# Patient Record
Sex: FEMALE | Race: WHITE | NOT HISPANIC OR LATINO | Employment: FULL TIME | URBAN - METROPOLITAN AREA
[De-identification: names, ages, dates, MRNs, and addresses within clinical notes are randomized per-mention and may not be internally consistent; named-entity substitution may affect disease eponyms.]

---

## 2017-05-02 ENCOUNTER — TRANSCRIBE ORDERS (OUTPATIENT)
Dept: ADMINISTRATIVE | Facility: HOSPITAL | Age: 57
End: 2017-05-02

## 2017-05-02 DIAGNOSIS — M25.531 RIGHT WRIST PAIN: Primary | ICD-10-CM

## 2017-05-08 ENCOUNTER — HOSPITAL ENCOUNTER (OUTPATIENT)
Dept: RADIOLOGY | Age: 57
Discharge: HOME | End: 2017-05-08
Payer: COMMERCIAL

## 2017-05-08 DIAGNOSIS — M25.531 RIGHT WRIST PAIN: ICD-10-CM

## 2017-05-08 PROCEDURE — 73221 MRI JOINT UPR EXTREM W/O DYE: CPT

## 2017-06-09 ENCOUNTER — GENERIC CONVERSION - ENCOUNTER (OUTPATIENT)
Dept: OBGYN CLINIC | Facility: CLINIC | Age: 57
End: 2017-06-09

## 2018-01-09 NOTE — MISCELLANEOUS
Message   Recorded as Task   Date: 10/20/2016 04:10 PM, Created By: Jp Palomares   Task Name: Follow Up   Assigned To: [facility] [employer]   Regarding Patient: Anshu Up, Status: In Progress   CommentMari Fore - 20 Oct 2016 4:10 PM     TASK CREATED  tell patient and send slip   BekahClaritza - 20 Oct 2016 4:15 PM     TASK IN PROGRESS   BekahClaritza Blackmon - 20 Oct 2016 4:33 PM     TASK EDITED  order placed in allscripts for 6 month f/u rt diag rajeev and u/s    slip faxed to  and sent to pt        Active Problems    1  Abnormal mammogram of right breast (793 80) (R92 8)   2  Dense breasts (793 82) (R92 2)   3  Encounter for gynecological examination without abnormal finding (V72 31) (Z01 419)   4  Encounter for routine gynecological examination with Papanicolaou smear of cervix   (V72 31,V76 2) (Z01 419)   5  Encounter for screening mammogram for malignant neoplasm of breast (V76 12)   (Z12 31)   6  Herpes simplex infection (054 9) (B00 9)   7  Osteopenia (733 90) (M85 80)   8  Screening for human papillomavirus (HPV) (V73 81) (Z11 51)    Current Meds   1  Flaxseed Oil 1000 MG Oral Capsule; Therapy: 49Uyb1570 to Recorded   2  Multi-Vitamin Daily Oral Tablet; Therapy: 00Qcx2089 to Recorded   3  Restasis 0 05 % Ophthalmic Emulsion; Therapy: 65Ijd4065 to Recorded   4  Turmeric Curcumin Oral Capsule Recorded   5  Xalatan 0 005 % Ophthalmic Solution (Latanoprost); Therapy: 44AXM9065 to (Last Rx:04Mar2011)  Requested for: 64GHE0326 Ordered    Allergies    1  No Known Drug Allergies    Plan  Abnormal mammogram of right breast    · *US BREAST RIGHT LIMITED (DIAGNOSTIC); Status:Hold For -  Scheduling,Retrospective By Protocol Authorization; Requested for:[id number];    · MAMMO DIAGNOSTIC RIGHT W CAD; Status:Hold For - Scheduling,Retrospective By  Protocol Authorization;  Requested for:53Uvn3128;     Signatures   Electronically signed by : Adele Baca, ; Oct 20 2016  4:35PM EST (Author)

## 2018-01-12 NOTE — MISCELLANEOUS
Message   Recorded as Task   Date: 06/08/2016 09:14 AM, Created By: Caroll Ormond   Task Name: Call Back   Assigned To: Emma Floyd   Regarding Patient: Vadim De Jesus, Status: In Progress   Comment:    Caroll Ormond - 08 Jun 2016 9:14 AM     TASK CREATED  Caller: Self; General Medical Question; [phone] (Home)  pt called - was expecting a call back from our office as to why she needs a f/u done of her mammo - what was found???  She has not received another Rx in the mail yet - please call pt to discuss  [facility] Jun 2016 9:19 AM     TASK IN PROGRESS   Manuelita Gitelman - 08 Jun 2016 9:24 AM     TASK EDITED  I explained to pt - needs r diag mammo and u/s  Offered to fax slip to [facility] - pt will wait to get it in mail        Active Problems    1  Dense breasts (793 82) (R92 2)   2  Encounter for gynecological examination without abnormal finding (V72 31) (Z01 419)   3  Encounter for routine gynecological examination with Papanicolaou smear of cervix   (V72 31,V76 2) (Z01 419)   4  Encounter for screening mammogram for malignant neoplasm of breast (V76 12)   (Z12 31)   5  Herpes simplex infection (054 9) (B00 9)   6  Osteopenia (733 90) (M85 80)   7  Screening for human papillomavirus (HPV) (V73 81) (Z11 51)    Current Meds   1  Calcium Plus Vitamin D CAPS; Transdermal vitamin d patch Recorded   2  Flaxseed Oil 1000 MG Oral Capsule; Therapy: 01Aug2013 to Recorded   3  Multi-Vitamin Daily Oral Tablet; Therapy: 73Kwz5332 to Recorded   4  Nystatin-Triamcinolone 298187-6 1 UNIT/GM-% External Cream; APPLY SPARINGLY TO   AFFECTED AREA(S) TWICE DAILY; Therapy: 66XDS7954 to (Evaluate:06Jan2014)  Requested for: 70NGP9558; Last   Rx:30Yes5946 Ordered   5  Restasis 0 05 % Ophthalmic Emulsion; Therapy: 31Pun7799 to Recorded   6  ValACYclovir HCl - 1 GM Oral Tablet; TAKE 1 TABLET DAILY AS DIRECTED; Therapy: 39CVJ0120 to (Micahel Galeano)  Requested for: 80GBM7596;  Last Rx: 31LVP2451 Ordered   7  Xalatan 0 005 % Ophthalmic Solution (Latanoprost); Therapy: 53FCM6750 to (Last Rx:04Mar2011)  Requested for: 11FJD2973 Ordered    Allergies    1  No Known Drug Allergies    Signatures   Electronically signed by :  Megan Barillas, ; Jun 8 2016  9:24AM EST                       (Author)

## 2018-01-12 NOTE — MISCELLANEOUS
Message   Recorded as Task   Date: 06/06/2016 08:36 AM, Created By: Bassem Martinez   Task Name: Medical Complaint Callback   Assigned To: Danny Jama   Regarding Patient: Huber Pereira, Status: In Progress   Comment:    Karis Daily - 06 Jun 2016 8:36 AM     TASK CREATED  Caller: Self; [phone] (Home); [phone] (Work)  needs a script for follow up mammo and us of the right breast, goes to [facility], wants a call back to know what to order  [address]  Jun 2016 9:09 AM     TASK IN [address] [place] [address],[address] - 06 Jun 2016 9:27 AM     TASK EDITED  rx sent to EHR and mailed per patient  Active Problems    1  Dense breasts (793 82) (R92 2)   2  Encounter for gynecological examination without abnormal finding (V72 31) (Z01 419)   3  Encounter for routine gynecological examination with Papanicolaou smear of cervix   (V72 31,V76 2) (Z01 419)   4  Encounter for screening mammogram for malignant neoplasm of breast (V76 12)   (Z12 31)   5  Herpes simplex infection (054 9) (B00 9)   6  Osteopenia (733 90) (M85 80)   7  Screening for human papillomavirus (HPV) (V73 81) (Z11 51)    Current Meds   1  Calcium Plus Vitamin D CAPS; Transdermal vitamin d patch Recorded   2  Flaxseed Oil 1000 MG Oral Capsule; Therapy: 95Yfx5736 to Recorded   3  Multi-Vitamin Daily Oral Tablet; Therapy: 62Mom5589 to Recorded   4  Nystatin-Triamcinolone 275116-6 1 UNIT/GM-% External Cream; APPLY SPARINGLY TO   AFFECTED AREA(S) TWICE DAILY; Therapy: 71GZX4331 to (Evaluate:06Jan2014)  Requested for: 86CUB2407; Last   Rx:48Aec5349 Ordered   5  Restasis 0 05 % Ophthalmic Emulsion; Therapy: 58Qhh4777 to Recorded   6  ValACYclovir HCl - 1 GM Oral Tablet; TAKE 1 TABLET DAILY AS DIRECTED; Therapy: 45CTK1820 to (Peter Lozoya)  Requested for: 70JMX7011; Last   Rx:14Oct2015 Ordered   7  Xalatan 0 005 % Ophthalmic Solution (Latanoprost);    Therapy: 33FCB1275 to (Last Rx:04Mar2011)  Requested for: 15DBR6788 Ordered    Allergies    1  No Known Drug Allergies    Plan  Dense breasts    · *US BREAST RIGHT LIMITED (DIAGNOSTIC); Status:Hold For -  Scheduling,Retrospective By Protocol Authorization; Requested LKX:[id number];    · MAMMO DIAGNOSTIC RIGHT W CAD; Status:Hold For - Scheduling,Retrospective By  Protocol Authorization;  Requested UZX:[id number];     Signatures   Electronically signed by : Charmaine Mancera LPN; Jun 6 8525  3:43AO EST                       (Author)

## 2018-01-15 ENCOUNTER — ALLSCRIPTS OFFICE VISIT (OUTPATIENT)
Dept: OTHER | Facility: OTHER | Age: 58
End: 2018-01-15

## 2018-01-15 ENCOUNTER — LAB REQUISITION (OUTPATIENT)
Dept: LAB | Facility: HOSPITAL | Age: 58
End: 2018-01-15
Payer: COMMERCIAL

## 2018-01-15 DIAGNOSIS — Z01.419 ENCOUNTER FOR GYNECOLOGICAL EXAMINATION WITHOUT ABNORMAL FINDING: ICD-10-CM

## 2018-01-15 DIAGNOSIS — Z11.51 ENCOUNTER FOR SCREENING FOR HUMAN PAPILLOMAVIRUS (HPV): ICD-10-CM

## 2018-01-15 DIAGNOSIS — Z12.31 ENCOUNTER FOR SCREENING MAMMOGRAM FOR MALIGNANT NEOPLASM OF BREAST: ICD-10-CM

## 2018-01-15 PROCEDURE — 87624 HPV HI-RISK TYP POOLED RSLT: CPT | Performed by: OBSTETRICS & GYNECOLOGY

## 2018-01-15 PROCEDURE — G0145 SCR C/V CYTO,THINLAYER,RESCR: HCPCS | Performed by: OBSTETRICS & GYNECOLOGY

## 2018-01-16 NOTE — PROGRESS NOTES
Assessment   1  Encounter for gynecological examination without abnormal finding (V72 31) (Z01 419)    Plan   Encounter for screening mammogram for malignant neoplasm of breast    · * MAMMO SCREENING BILATERAL W CAD; Status:Hold For - Scheduling,Retrospective    Authorization; Requested DGZ:[id number]; Perform:[facility]; MSW:[id number]; Last Updated [facility]; 1/15/2018 4:02:31 PM;Ordered;For:Encounter for screening mammogram for malignant neoplasm of breast; Ordered By:Amaya Mcadams;    Discussion/Summary   healthy adult female the risks and benefits of cervical cancer screening were discussed HPV and Pap Co-testing Done Today Breast cancer screening: the risks and benefits of breast cancer screening were discussed and mammogram has been ordered  Colorectal cancer screening: the risks and benefits of colorectal cancer screening were discussed and colorectal cancer screening is current  The patient has the current Goals: Maintain a healthy lifestyle  None  Patient is able to Self-Care  Chief Complaint   Patient presents today for a yearly GYN exam       History of Present Illness   HPI: Patient is here for annual gyn exam    She has no new problems   colonoscopy done last month showed polyps    GYN HM, Adult Female [facility] The patient is being seen for a gynecology evaluation  The last health maintenance visit was 1 year(s) ago  Social History: The patient is a former cigarette smoker  She reports occasional alcohol use  She has never used illicit drugs  General Health: The patient's health since the last visit is described as good  Lifestyle:  She does not have any weight concerns  -- She exercises regularly  -- She does not use tobacco  The patient is a former cigarette smoker  -- She consumes alcohol -- She denies drug use  Reproductive health: the patient is postmenopausal--   she is not sexually active      Screening: Cervical cancer screening includes a pap smear performed 2014 neg-- and-- human papilloma virus screening performed 2014 neg  Breast cancer screening includes a mammogram performed last year-- and-- monthly breast self-exams performed  Colorectal cancer screening includes a colonoscopy performed 12/15/2017  Review of Systems   no pelvic pain,-- no postmenopausal bleeding,-- no dysuria-- and-- no urinary loss of control  Cardiovascular: no complaints of slow or fast heart rate, no chest pain, no palpitations, no leg claudication or lower extremity edema  Respiratory: no complaints of shortness of breath, no wheezing, no dyspnea on exertion, no orthopnea or PND  Breasts: no complaints of breast pain, breast lump or nipple discharge  Gastrointestinal: no complaints of abdominal pain, no constipation, no nausea or diarrhea, no vomiting, no bloody stools  Genitourinary: as noted in HPI  OB History   Pregnancy History (Brief):      Prior pregnancies: : 5  Para: 2 (full-term),-- 1 (abortions)-- and-- 2 (living)      Delivery type: 2 vaginal      Additional pregnancy history details: 1 miscarriage(s)       Active Problems   1  Abnormal mammogram of right breast (793 80) (R92 8)   2  Dense breasts (793 82) (R92 2)   3  Encounter for gynecological examination without abnormal finding (V72 31) (Z01 419)   4  Encounter for routine gynecological examination with Papanicolaou smear of cervix     (V72 31,V76 2) (Z01 419)   5  Encounter for screening mammogram for malignant neoplasm of breast (V76 12)     (Z12 31)   6  Herpes simplex infection (054 9) (B00 9)   7  Osteopenia (733 90) (M85 80)   8   Screening for human papillomavirus (HPV) (V73 81) (Z11 51)    Past Medical History    · 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)   · History of Anxiety (300 00) (F41 9)   · History of Calculus of gallbladder w/o mention of cholecystitis or obstruction (574 20)    (K80 20)   · History of Cervical Atypism (622 10)   · History of Decreased libido (799 81) (R68 82)   · History of Dysmenorrhea (625 3) (N94 6)   · History of candidal vulvovaginitis (V13 29) (Z86 19)   · History of dyspareunia (V13 29)   · History of hemorrhoids (V13 89) (Z87 19)   · History of hyperlipidemia (V12 29) (Z86 39)   · History of menorrhagia (V13 29) (Z87 42)   · History of migraine (V12 49) (Z86 69)   · History of premenstrual syndrome (V13 29) (Z87 42)   · History of Missed  (632) (O02 1)   · History of Nontoxic single thyroid nodule (241 0) (E04 1)   · Normal delivery (650) (O80,Z37  9)    Surgical History    · History of Cervix Cryosurgery   · History of Colposcopy Cervix With Biopsy(S) With Endocervical Curettage   · History of Dilation And Curettage   · History of Surgically Induced  - By Dilation And Evacuation   · History of Tubal Ligation    Family History   Mother    · Family history of Adenocarcinoma of uterus   · Family history of Carcinoma Of The Vulva   · Family history of malignant neoplasm of breast (V16 3) (Z80 3)   · Family history of Varicose Veins Of Lower Extremities  Father    · Family history of Cancer  Maternal Grandmother    · Family history of Reported Family History Of Kidney Disease   · Family history of Systemic Lupus Erythematosus  Paternal Grandmother    · Family history of Reported Family History Of Heart Disease  Maternal Grandfather    · FH: stroke (V17 1) (Z82 3)    Social History    · Being A Social Drinker   · Daily Coffee Consumption (2  Cups/Day)   · Former smoker (V15 82) (Y37 607)   · Marital History - Single    Current Meds    1  Atorvastatin Calcium 10 MG Oral Tablet; Therapy: 15DAU2650 to Recorded   2  Flaxseed Oil 1000 MG Oral Capsule; Therapy: 89Drb3270 to Recorded   3  Multi-Vitamin Daily Oral Tablet; Therapy: 49Qrw7848 to Recorded   4  Restasis 0 05 % Ophthalmic Emulsion; Therapy: 89Yvr3178 to Recorded   5  Xalatan 0 005 % Ophthalmic Solution;      Therapy: 60XTE3708 to (Last EJ:56QDA7170)  Requested for: 12ITU2978 Ordered    Allergies   1  No Known Drug Allergies    Vitals    Recorded: 35TQN0239 38:37TH   Systolic 502   Diastolic 92   Height 5 ft 6 5 in   Weight 142 lb    BMI Calculated 22 58   BSA Calculated 1 74   LMP      Physical Exam        Constitutional      General appearance: No acute distress, well appearing and well nourished  Pulmonary      Auscultation of lungs: Clear to auscultation  Cardiovascular      Auscultation of heart: Normal rate and rhythm, normal S1 and S2, no murmurs  Genitourinary      External genitalia: Normal and no lesions appreciated  Vagina: Normal, no lesions or dryness appreciated  Urethra: Normal        Urethral meatus: Normal        Bladder: Normal, soft, non-tender and no prolapse or masses appreciated  Cervix: Normal, no palpable masses  Examination of the cervix revealed normal findings  A Pap smear was not performed  Uterus: Normal, non-tender, not enlarged, and no palpable masses  Anus, perineum, and rectum: Normal sphincter tone, no masses, and no prolapse  Chest      Breasts: Normal and no dimpling or skin changes noted  normal appearance  Examination of the nipples revealed normal appearance  Right Breast:  No masses palpated  Left Breast: No masses palpated  Abdomen      Abdomen: Normal, non-tender, and no organomegaly noted         Signatures    Electronically signed by : LAUREN Bejarano ; Ovi 15 2018  5:19PM EST                       (Author)

## 2018-01-17 LAB — HPV RRNA GENITAL QL NAA+PROBE: NORMAL

## 2018-01-18 LAB
LAB AP GYN PRIMARY INTERPRETATION: NORMAL
Lab: NORMAL

## 2018-01-23 VITALS
HEIGHT: 67 IN | DIASTOLIC BLOOD PRESSURE: 92 MMHG | SYSTOLIC BLOOD PRESSURE: 130 MMHG | WEIGHT: 142 LBS | BODY MASS INDEX: 22.29 KG/M2

## 2019-01-23 ENCOUNTER — ANNUAL EXAM (OUTPATIENT)
Dept: OBGYN CLINIC | Facility: CLINIC | Age: 59
End: 2019-01-23
Payer: COMMERCIAL

## 2019-01-23 VITALS
BODY MASS INDEX: 24.43 KG/M2 | DIASTOLIC BLOOD PRESSURE: 80 MMHG | SYSTOLIC BLOOD PRESSURE: 142 MMHG | HEIGHT: 66 IN | WEIGHT: 152 LBS

## 2019-01-23 DIAGNOSIS — Z12.31 ENCOUNTER FOR SCREENING MAMMOGRAM FOR MALIGNANT NEOPLASM OF BREAST: ICD-10-CM

## 2019-01-23 DIAGNOSIS — B36.9 FUNGAL DERMATITIS: Primary | ICD-10-CM

## 2019-01-23 DIAGNOSIS — Z01.419 ENCNTR FOR GYN EXAM (GENERAL) (ROUTINE) W/O ABN FINDINGS: ICD-10-CM

## 2019-01-23 PROCEDURE — 99396 PREV VISIT EST AGE 40-64: CPT | Performed by: PHYSICIAN ASSISTANT

## 2019-01-23 RX ORDER — LATANOPROST 50 UG/ML
1 SOLUTION/ DROPS OPHTHALMIC
COMMUNITY
Start: 2018-11-14 | End: 2020-02-11 | Stop reason: ALTCHOICE

## 2019-01-23 RX ORDER — ATORVASTATIN CALCIUM 10 MG/1
10 TABLET, FILM COATED ORAL
COMMUNITY
Start: 2019-01-10 | End: 2020-02-11 | Stop reason: SDUPTHER

## 2019-01-23 RX ORDER — ECHINACEA 400 MG
CAPSULE ORAL
COMMUNITY
Start: 2013-08-01

## 2019-01-23 RX ORDER — NYSTATIN AND TRIAMCINOLONE ACETONIDE 100000; 1 [USP'U]/G; MG/G
OINTMENT TOPICAL 2 TIMES DAILY
Qty: 30 G | Refills: 1 | Status: SHIPPED | OUTPATIENT
Start: 2019-01-23 | End: 2020-02-11 | Stop reason: ALTCHOICE

## 2019-01-23 NOTE — PROGRESS NOTES
Terry Baires   1960    CC:  Yearly exam    S:  66 y o  female here for yearly exam  She is postmenopausal and has had no vaginal bleeding  She denies vaginal discharge, itching, odor or dryness  She is not sexually active with her  due to lack of interest with him  Her mother had breast cancer and was on tamoxifen and then was dx with endometrial cancer  By the time of dx she already had metastatic disease and  very quickly  We discussed tamoxifen as likely the culprit for her endometrial cancer and that it was less likely a genetic issue; nonetheless, we discussed the need to call immediately with any vaginal bleeding  She notes an itchy rash in her groin creases off and on  She has a script for nystatin/triamcinolone ointment I gave her years ago and she would like a refill on this  Last Pap 1/15/18 neg/neg  Last Mammo 18 neg  Last Colonoscopy 2018 - due in 3 years (polyps)    Current Outpatient Prescriptions:     atorvastatin (LIPITOR) 10 mg tablet, Take 10 mg by mouth, Disp: , Rfl:     co-enzyme Q-10 30 MG capsule, Take 30 mg by mouth 3 (three) times a day, Disp: , Rfl:     Flaxseed, Linseed, (FLAXSEED OIL) 1000 MG CAPS, Take by mouth, Disp: , Rfl:     latanoprost (XALATAN) 0 005 % ophthalmic solution, , Disp: , Rfl:   Social History     Social History    Marital status: /Civil Union     Spouse name: N/A    Number of children: N/A    Years of education: N/A     Occupational History    Not on file       Social History Main Topics    Smoking status: Former Smoker    Smokeless tobacco: Never Used    Alcohol use Yes      Comment: socially     Drug use: No    Sexual activity: Not Currently     Other Topics Concern    Not on file     Social History Narrative    No narrative on file     Family History   Problem Relation Age of Onset    Breast cancer Mother     Uterine cancer Mother     Throat cancer Father     Lupus Maternal Grandmother     Stroke Maternal Grandfather      Past Medical History:   Diagnosis Date    Herpes     High cholesterol         O:  Blood pressure 142/80, height 5' 6" (1 676 m), weight 68 9 kg (152 lb)  Patient appears well and is not in distress  Neck is supple without masses  Breasts are symmetrical without mass, tenderness, nipple discharge, skin changes or adenopathy  Abdomen is soft and nontender without masses  External genitals are normal without lesions; there is a fungal rash in the groin creases bilaterally  Vagina is atrophic without discharge or bleeding  Cervix is normal without discharge or lesion  Uterus is normal, mobile, nontender without palpable mass  Adnexa are normal, nontender, without palpable mass  A:  Yearly exam      P:   Pap 2023   Mammo slip given   Nystatin/triamcinolone ointment BID for her fungal rash   RTO one year for yearly exam or sooner as needed

## 2019-02-25 ENCOUNTER — TELEPHONE (OUTPATIENT)
Dept: OBGYN CLINIC | Facility: CLINIC | Age: 59
End: 2019-02-25

## 2019-05-09 RX ORDER — DIPHENOXYLATE HYDROCHLORIDE AND ATROPINE SULFATE 2.5; .025 MG/1; MG/1
1 TABLET ORAL DAILY
COMMUNITY

## 2019-05-13 ENCOUNTER — ANESTHESIA EVENT (OUTPATIENT)
Dept: PERIOP | Facility: HOSPITAL | Age: 59
End: 2019-05-13
Payer: COMMERCIAL

## 2019-05-14 ENCOUNTER — HOSPITAL ENCOUNTER (OUTPATIENT)
Facility: HOSPITAL | Age: 59
Setting detail: OUTPATIENT SURGERY
Discharge: HOME | End: 2019-05-14
Attending: OPHTHALMOLOGY | Admitting: OPHTHALMOLOGY
Payer: COMMERCIAL

## 2019-05-14 ENCOUNTER — ANESTHESIA (OUTPATIENT)
Dept: PERIOP | Facility: HOSPITAL | Age: 59
End: 2019-05-14
Payer: COMMERCIAL

## 2019-05-14 VITALS
DIASTOLIC BLOOD PRESSURE: 68 MMHG | RESPIRATION RATE: 20 BRPM | SYSTOLIC BLOOD PRESSURE: 118 MMHG | OXYGEN SATURATION: 99 % | HEART RATE: 65 BPM | TEMPERATURE: 96.7 F

## 2019-05-14 PROBLEM — H25.811 COMBINED FORMS OF AGE-RELATED CATARACT OF RIGHT EYE: Status: RESOLVED | Noted: 2019-05-14 | Resolved: 2019-05-14

## 2019-05-14 PROBLEM — H25.811 COMBINED FORMS OF AGE-RELATED CATARACT OF RIGHT EYE: Status: ACTIVE | Noted: 2019-05-14

## 2019-05-14 PROCEDURE — V2787 ASTIGMATISM-CORRECT FUNCTION: HCPCS | Performed by: OPHTHALMOLOGY

## 2019-05-14 DEVICE — ACRYSOF(R) IQ TORIC ASTIGMATISM IOL, SINGLE-PIECE ACRYLIC FOLDABLE LENS, UV WITH BLUE LIGHTFILTER, 13.0MM LENGTH, 6.0MM BICONVEX TORICASPHERIC OPTIC, 6.00 D CYLINDER.
Type: IMPLANTABLE DEVICE | Site: POSTERIOR CHAMBER | Status: FUNCTIONAL
Brand: ACRYSOF®

## 2019-05-14 RX ORDER — FENTANYL CITRATE 50 UG/ML
INJECTION, SOLUTION INTRAMUSCULAR; INTRAVENOUS AS NEEDED
Status: DISCONTINUED | OUTPATIENT
Start: 2019-05-14 | End: 2019-05-14 | Stop reason: SURG

## 2019-05-14 RX ORDER — NEOMYCIN SULFATE, POLYMYXIN B SULFATE, AND DEXAMETHASONE 3.5; 10000; 1 MG/G; [USP'U]/G; MG/G
OINTMENT OPHTHALMIC AS NEEDED
Status: DISCONTINUED | OUTPATIENT
Start: 2019-05-14 | End: 2019-05-14 | Stop reason: HOSPADM

## 2019-05-14 RX ORDER — LIDOCAINE HYDROCHLORIDE 10 MG/ML
INJECTION, SOLUTION EPIDURAL; INFILTRATION; INTRACAUDAL; PERINEURAL AS NEEDED
Status: DISCONTINUED | OUTPATIENT
Start: 2019-05-14 | End: 2019-05-14 | Stop reason: HOSPADM

## 2019-05-14 RX ORDER — DIPHENHYDRAMINE HYDROCHLORIDE 50 MG/ML
INJECTION INTRAMUSCULAR; INTRAVENOUS AS NEEDED
Status: DISCONTINUED | OUTPATIENT
Start: 2019-05-14 | End: 2019-05-14 | Stop reason: SURG

## 2019-05-14 RX ORDER — PROPOFOL 10 MG/ML
INJECTION, EMULSION INTRAVENOUS CONTINUOUS PRN
Status: DISCONTINUED | OUTPATIENT
Start: 2019-05-14 | End: 2019-05-14

## 2019-05-14 RX ORDER — ONDANSETRON 2 MG/ML
INJECTION INTRAMUSCULAR; INTRAVENOUS AS NEEDED
Status: DISCONTINUED | OUTPATIENT
Start: 2019-05-14 | End: 2019-05-14 | Stop reason: SURG

## 2019-05-14 RX ORDER — ACETAZOLAMIDE 250 MG/1
250 TABLET ORAL ONCE
Status: COMPLETED | OUTPATIENT
Start: 2019-05-14 | End: 2019-05-14

## 2019-05-14 RX ORDER — LIDOCAINE HYDROCHLORIDE 20 MG/ML
JELLY TOPICAL AS NEEDED
Status: DISCONTINUED | OUTPATIENT
Start: 2019-05-14 | End: 2019-05-14 | Stop reason: HOSPADM

## 2019-05-14 RX ORDER — BALANCED SALT SOLUTION 6.4; .75; .48; .3; 3.9; 1.7 MG/ML; MG/ML; MG/ML; MG/ML; MG/ML; MG/ML
SOLUTION OPHTHALMIC AS NEEDED
Status: DISCONTINUED | OUTPATIENT
Start: 2019-05-14 | End: 2019-05-14 | Stop reason: HOSPADM

## 2019-05-14 RX ORDER — PROPOFOL 10 MG/ML
INJECTION, EMULSION INTRAVENOUS AS NEEDED
Status: DISCONTINUED | OUTPATIENT
Start: 2019-05-14 | End: 2019-05-14 | Stop reason: SURG

## 2019-05-14 RX ORDER — LIDOCAINE HYDROCHLORIDE 10 MG/ML
INJECTION, SOLUTION INFILTRATION; PERINEURAL AS NEEDED
Status: DISCONTINUED | OUTPATIENT
Start: 2019-05-14 | End: 2019-05-14 | Stop reason: SURG

## 2019-05-14 RX ORDER — MIDAZOLAM HYDROCHLORIDE 1 MG/ML
INJECTION INTRAMUSCULAR; INTRAVENOUS AS NEEDED
Status: DISCONTINUED | OUTPATIENT
Start: 2019-05-14 | End: 2019-05-14 | Stop reason: SURG

## 2019-05-14 RX ORDER — TETRACAINE HYDROCHLORIDE 5 MG/ML
SOLUTION OPHTHALMIC AS NEEDED
Status: DISCONTINUED | OUTPATIENT
Start: 2019-05-14 | End: 2019-05-14 | Stop reason: HOSPADM

## 2019-05-14 RX ORDER — CYCLOPENTOLATE HYDROCHLORIDE 10 MG/ML
1 SOLUTION/ DROPS OPHTHALMIC
Status: COMPLETED | OUTPATIENT
Start: 2019-05-14 | End: 2019-05-14

## 2019-05-14 RX ORDER — SODIUM CHLORIDE 9 MG/ML
125 INJECTION, SOLUTION INTRAVENOUS CONTINUOUS
Status: DISCONTINUED | OUTPATIENT
Start: 2019-05-14 | End: 2019-05-14 | Stop reason: HOSPADM

## 2019-05-14 RX ORDER — TROPICAMIDE 10 MG/ML
1 SOLUTION/ DROPS OPHTHALMIC
Status: COMPLETED | OUTPATIENT
Start: 2019-05-14 | End: 2019-05-14

## 2019-05-14 RX ORDER — DEXAMETHASONE SODIUM PHOSPHATE 4 MG/ML
INJECTION, SOLUTION INTRA-ARTICULAR; INTRALESIONAL; INTRAMUSCULAR; INTRAVENOUS; SOFT TISSUE AS NEEDED
Status: DISCONTINUED | OUTPATIENT
Start: 2019-05-14 | End: 2019-05-14 | Stop reason: SURG

## 2019-05-14 RX ORDER — PHENYLEPHRINE HCL 2.5 %
1 DROPS OPHTHALMIC (EYE)
Status: COMPLETED | OUTPATIENT
Start: 2019-05-14 | End: 2019-05-14

## 2019-05-14 RX ORDER — ACETAMINOPHEN 325 MG/1
650 TABLET ORAL EVERY 4 HOURS PRN
Status: DISCONTINUED | OUTPATIENT
Start: 2019-05-14 | End: 2019-05-14 | Stop reason: HOSPADM

## 2019-05-14 RX ORDER — SCOLOPAMINE TRANSDERMAL SYSTEM 1 MG/1
1 PATCH, EXTENDED RELEASE TRANSDERMAL
Status: DISCONTINUED | OUTPATIENT
Start: 2019-05-14 | End: 2019-05-14 | Stop reason: HOSPADM

## 2019-05-14 RX ORDER — LIDOCAINE HYDROCHLORIDE 20 MG/ML
INJECTION, SOLUTION EPIDURAL; INFILTRATION; INTRACAUDAL; PERINEURAL AS NEEDED
Status: DISCONTINUED | OUTPATIENT
Start: 2019-05-14 | End: 2019-05-14 | Stop reason: HOSPADM

## 2019-05-14 RX ADMIN — CYCLOPENTOLATE HYDROCHLORIDE 1 DROP: 10 SOLUTION/ DROPS OPHTHALMIC at 06:26

## 2019-05-14 RX ADMIN — FENTANYL CITRATE 50 MCG: 50 INJECTION INTRAMUSCULAR; INTRAVENOUS at 08:13

## 2019-05-14 RX ADMIN — PROPOFOL 75 MCG/KG/MIN: 10 INJECTION, EMULSION INTRAVENOUS at 08:22

## 2019-05-14 RX ADMIN — PHENYLEPHRINE HYDROCHLORIDE 1 DROP: 25 SOLUTION/ DROPS OPHTHALMIC at 06:45

## 2019-05-14 RX ADMIN — PROPOFOL 30 MG: 10 INJECTION, EMULSION INTRAVENOUS at 08:13

## 2019-05-14 RX ADMIN — PHENYLEPHRINE HYDROCHLORIDE 1 DROP: 25 SOLUTION/ DROPS OPHTHALMIC at 06:26

## 2019-05-14 RX ADMIN — SODIUM CHLORIDE: 9 INJECTION, SOLUTION INTRAVENOUS at 08:49

## 2019-05-14 RX ADMIN — CYCLOPENTOLATE HYDROCHLORIDE 1 DROP: 10 SOLUTION/ DROPS OPHTHALMIC at 06:09

## 2019-05-14 RX ADMIN — ONDANSETRON HYDROCHLORIDE 4 MG: 2 INJECTION, SOLUTION INTRAMUSCULAR; INTRAVENOUS at 08:25

## 2019-05-14 RX ADMIN — MIDAZOLAM HYDROCHLORIDE 2 MG: 1 INJECTION, SOLUTION INTRAMUSCULAR; INTRAVENOUS at 07:50

## 2019-05-14 RX ADMIN — DEXAMETHASONE SODIUM PHOSPHATE 4 MG: 4 INJECTION, SOLUTION INTRA-ARTICULAR; INTRALESIONAL; INTRAMUSCULAR; INTRAVENOUS; SOFT TISSUE at 08:25

## 2019-05-14 RX ADMIN — ACETAZOLAMIDE 250 MG: 250 TABLET ORAL at 10:41

## 2019-05-14 RX ADMIN — TROPICAMIDE 1 DROP: 10 SOLUTION/ DROPS OPHTHALMIC at 06:09

## 2019-05-14 RX ADMIN — SODIUM CHLORIDE 125 ML/HR: 9 INJECTION, SOLUTION INTRAVENOUS at 07:36

## 2019-05-14 RX ADMIN — CYCLOPENTOLATE HYDROCHLORIDE 1 DROP: 10 SOLUTION/ DROPS OPHTHALMIC at 06:58

## 2019-05-14 RX ADMIN — TROPICAMIDE 1 DROP: 10 SOLUTION/ DROPS OPHTHALMIC at 06:26

## 2019-05-14 RX ADMIN — PHENYLEPHRINE HYDROCHLORIDE 1 DROP: 25 SOLUTION/ DROPS OPHTHALMIC at 06:09

## 2019-05-14 RX ADMIN — TROPICAMIDE 1 DROP: 10 SOLUTION/ DROPS OPHTHALMIC at 06:58

## 2019-05-14 RX ADMIN — PHENYLEPHRINE HYDROCHLORIDE 1 DROP: 25 SOLUTION/ DROPS OPHTHALMIC at 06:58

## 2019-05-14 RX ADMIN — TROPICAMIDE 1 DROP: 10 SOLUTION/ DROPS OPHTHALMIC at 06:45

## 2019-05-14 RX ADMIN — MIDAZOLAM HYDROCHLORIDE 2 MG: 1 INJECTION, SOLUTION INTRAMUSCULAR; INTRAVENOUS at 08:09

## 2019-05-14 RX ADMIN — PROPOFOL 50 MG: 10 INJECTION, EMULSION INTRAVENOUS at 08:20

## 2019-05-14 RX ADMIN — DIPHENHYDRAMINE HYDROCHLORIDE 12.5 MG: 50 INJECTION INTRAMUSCULAR; INTRAVENOUS at 08:26

## 2019-05-14 RX ADMIN — LIDOCAINE HYDROCHLORIDE 50 MG: 10 INJECTION, SOLUTION INFILTRATION; PERINEURAL at 08:13

## 2019-05-14 RX ADMIN — CYCLOPENTOLATE HYDROCHLORIDE 1 DROP: 10 SOLUTION/ DROPS OPHTHALMIC at 06:45

## 2019-05-14 RX ADMIN — SCOPALAMINE 1 PATCH: 1 PATCH, EXTENDED RELEASE TRANSDERMAL at 06:59

## 2019-05-20 ENCOUNTER — ANESTHESIA EVENT (OUTPATIENT)
Dept: PERIOP | Facility: HOSPITAL | Age: 59
End: 2019-05-20
Payer: COMMERCIAL

## 2019-05-21 ENCOUNTER — ANESTHESIA (OUTPATIENT)
Dept: PERIOP | Facility: HOSPITAL | Age: 59
End: 2019-05-21
Payer: COMMERCIAL

## 2019-05-21 ENCOUNTER — HOSPITAL ENCOUNTER (OUTPATIENT)
Facility: HOSPITAL | Age: 59
Setting detail: OUTPATIENT SURGERY
Discharge: HOME | End: 2019-05-21
Attending: OPHTHALMOLOGY | Admitting: OPHTHALMOLOGY
Payer: COMMERCIAL

## 2019-05-21 VITALS
DIASTOLIC BLOOD PRESSURE: 66 MMHG | RESPIRATION RATE: 20 BRPM | SYSTOLIC BLOOD PRESSURE: 112 MMHG | OXYGEN SATURATION: 97 % | BODY MASS INDEX: 23.54 KG/M2 | HEART RATE: 84 BPM | TEMPERATURE: 97.8 F | HEIGHT: 67 IN | WEIGHT: 150 LBS

## 2019-05-21 PROBLEM — H25.812 COMBINED FORMS OF AGE-RELATED CATARACT OF LEFT EYE: Status: RESOLVED | Noted: 2019-05-21 | Resolved: 2019-05-21

## 2019-05-21 PROBLEM — H25.812 COMBINED FORMS OF AGE-RELATED CATARACT OF LEFT EYE: Status: ACTIVE | Noted: 2019-05-21

## 2019-05-21 PROCEDURE — V2787 ASTIGMATISM-CORRECT FUNCTION: HCPCS | Performed by: OPHTHALMOLOGY

## 2019-05-21 DEVICE — IMPLANTABLE DEVICE: Type: IMPLANTABLE DEVICE | Site: POSTERIOR CHAMBER | Status: FUNCTIONAL

## 2019-05-21 RX ORDER — PHENYLEPHRINE HCL 2.5 %
1 DROPS OPHTHALMIC (EYE)
Status: COMPLETED | OUTPATIENT
Start: 2019-05-21 | End: 2019-05-21

## 2019-05-21 RX ORDER — ACETAMINOPHEN 325 MG/1
650 TABLET ORAL EVERY 4 HOURS PRN
Status: DISCONTINUED | OUTPATIENT
Start: 2019-05-21 | End: 2019-05-21 | Stop reason: HOSPADM

## 2019-05-21 RX ORDER — SODIUM CHLORIDE 9 MG/ML
125 INJECTION, SOLUTION INTRAVENOUS CONTINUOUS
Status: DISCONTINUED | OUTPATIENT
Start: 2019-05-21 | End: 2019-05-21 | Stop reason: HOSPADM

## 2019-05-21 RX ORDER — ACETAZOLAMIDE 250 MG/1
250 TABLET ORAL ONCE
Status: COMPLETED | OUTPATIENT
Start: 2019-05-21 | End: 2019-05-21

## 2019-05-21 RX ORDER — SODIUM CHLORIDE 9 MG/ML
INJECTION, SOLUTION INTRAVENOUS CONTINUOUS PRN
Status: DISCONTINUED | OUTPATIENT
Start: 2019-05-21 | End: 2019-05-21 | Stop reason: SURG

## 2019-05-21 RX ORDER — NEOMYCIN SULFATE, POLYMYXIN B SULFATE, AND DEXAMETHASONE 3.5; 10000; 1 MG/G; [USP'U]/G; MG/G
OINTMENT OPHTHALMIC AS NEEDED
Status: DISCONTINUED | OUTPATIENT
Start: 2019-05-21 | End: 2019-05-21 | Stop reason: HOSPADM

## 2019-05-21 RX ORDER — BALANCED SALT SOLUTION 6.4; .75; .48; .3; 3.9; 1.7 MG/ML; MG/ML; MG/ML; MG/ML; MG/ML; MG/ML
SOLUTION OPHTHALMIC AS NEEDED
Status: DISCONTINUED | OUTPATIENT
Start: 2019-05-21 | End: 2019-05-21 | Stop reason: HOSPADM

## 2019-05-21 RX ORDER — TETRACAINE HYDROCHLORIDE 5 MG/ML
SOLUTION OPHTHALMIC AS NEEDED
Status: DISCONTINUED | OUTPATIENT
Start: 2019-05-21 | End: 2019-05-21 | Stop reason: HOSPADM

## 2019-05-21 RX ORDER — TROPICAMIDE 10 MG/ML
1 SOLUTION/ DROPS OPHTHALMIC
Status: COMPLETED | OUTPATIENT
Start: 2019-05-21 | End: 2019-05-21

## 2019-05-21 RX ORDER — LIDOCAINE HYDROCHLORIDE 20 MG/ML
JELLY TOPICAL AS NEEDED
Status: DISCONTINUED | OUTPATIENT
Start: 2019-05-21 | End: 2019-05-21 | Stop reason: HOSPADM

## 2019-05-21 RX ORDER — MIDAZOLAM HYDROCHLORIDE 1 MG/ML
INJECTION INTRAMUSCULAR; INTRAVENOUS AS NEEDED
Status: DISCONTINUED | OUTPATIENT
Start: 2019-05-21 | End: 2019-05-21 | Stop reason: SURG

## 2019-05-21 RX ORDER — SCOLOPAMINE TRANSDERMAL SYSTEM 1 MG/1
1 PATCH, EXTENDED RELEASE TRANSDERMAL
Status: DISCONTINUED | OUTPATIENT
Start: 2019-05-21 | End: 2019-05-21 | Stop reason: HOSPADM

## 2019-05-21 RX ORDER — FENTANYL CITRATE 50 UG/ML
INJECTION, SOLUTION INTRAMUSCULAR; INTRAVENOUS AS NEEDED
Status: DISCONTINUED | OUTPATIENT
Start: 2019-05-21 | End: 2019-05-21 | Stop reason: SURG

## 2019-05-21 RX ORDER — LIDOCAINE HYDROCHLORIDE 10 MG/ML
INJECTION, SOLUTION EPIDURAL; INFILTRATION; INTRACAUDAL; PERINEURAL AS NEEDED
Status: DISCONTINUED | OUTPATIENT
Start: 2019-05-21 | End: 2019-05-21 | Stop reason: HOSPADM

## 2019-05-21 RX ORDER — CYCLOPENTOLATE HYDROCHLORIDE 10 MG/ML
1 SOLUTION/ DROPS OPHTHALMIC
Status: COMPLETED | OUTPATIENT
Start: 2019-05-21 | End: 2019-05-21

## 2019-05-21 RX ORDER — LIDOCAINE HYDROCHLORIDE 20 MG/ML
INJECTION, SOLUTION EPIDURAL; INFILTRATION; INTRACAUDAL; PERINEURAL AS NEEDED
Status: DISCONTINUED | OUTPATIENT
Start: 2019-05-21 | End: 2019-05-21 | Stop reason: HOSPADM

## 2019-05-21 RX ADMIN — CYCLOPENTOLATE HYDROCHLORIDE 1 DROP: 10 SOLUTION/ DROPS OPHTHALMIC at 07:37

## 2019-05-21 RX ADMIN — TROPICAMIDE 1 DROP: 10 SOLUTION/ DROPS OPHTHALMIC at 07:20

## 2019-05-21 RX ADMIN — FENTANYL CITRATE 50 MCG: 50 INJECTION INTRAMUSCULAR; INTRAVENOUS at 08:47

## 2019-05-21 RX ADMIN — FENTANYL CITRATE 25 MCG: 50 INJECTION INTRAMUSCULAR; INTRAVENOUS at 08:49

## 2019-05-21 RX ADMIN — FENTANYL CITRATE 25 MCG: 50 INJECTION INTRAMUSCULAR; INTRAVENOUS at 09:05

## 2019-05-21 RX ADMIN — PHENYLEPHRINE HYDROCHLORIDE 1 DROP: 25 SOLUTION/ DROPS OPHTHALMIC at 07:53

## 2019-05-21 RX ADMIN — PHENYLEPHRINE HYDROCHLORIDE 1 DROP: 25 SOLUTION/ DROPS OPHTHALMIC at 07:04

## 2019-05-21 RX ADMIN — ACETAZOLAMIDE 250 MG: 250 TABLET ORAL at 10:19

## 2019-05-21 RX ADMIN — TROPICAMIDE 1 DROP: 10 SOLUTION/ DROPS OPHTHALMIC at 07:53

## 2019-05-21 RX ADMIN — PHENYLEPHRINE HYDROCHLORIDE 1 DROP: 25 SOLUTION/ DROPS OPHTHALMIC at 07:36

## 2019-05-21 RX ADMIN — MIDAZOLAM HYDROCHLORIDE 1 MG: 1 INJECTION, SOLUTION INTRAMUSCULAR; INTRAVENOUS at 08:51

## 2019-05-21 RX ADMIN — MIDAZOLAM HYDROCHLORIDE 2 MG: 1 INJECTION, SOLUTION INTRAMUSCULAR; INTRAVENOUS at 08:47

## 2019-05-21 RX ADMIN — TROPICAMIDE 1 DROP: 10 SOLUTION/ DROPS OPHTHALMIC at 07:05

## 2019-05-21 RX ADMIN — PHENYLEPHRINE HYDROCHLORIDE 1 DROP: 25 SOLUTION/ DROPS OPHTHALMIC at 07:20

## 2019-05-21 RX ADMIN — FENTANYL CITRATE 25 MCG: 50 INJECTION INTRAMUSCULAR; INTRAVENOUS at 08:51

## 2019-05-21 RX ADMIN — TROPICAMIDE 1 DROP: 10 SOLUTION/ DROPS OPHTHALMIC at 07:36

## 2019-05-21 RX ADMIN — SODIUM CHLORIDE: 0.9 INJECTION, SOLUTION INTRAVENOUS at 08:45

## 2019-05-21 RX ADMIN — CYCLOPENTOLATE HYDROCHLORIDE 1 DROP: 10 SOLUTION/ DROPS OPHTHALMIC at 07:04

## 2019-05-21 RX ADMIN — SCOPALAMINE 1 PATCH: 1 PATCH, EXTENDED RELEASE TRANSDERMAL at 07:04

## 2019-05-21 RX ADMIN — CYCLOPENTOLATE HYDROCHLORIDE 1 DROP: 10 SOLUTION/ DROPS OPHTHALMIC at 07:20

## 2019-05-21 RX ADMIN — MIDAZOLAM HYDROCHLORIDE 1 MG: 1 INJECTION, SOLUTION INTRAMUSCULAR; INTRAVENOUS at 08:49

## 2019-05-21 RX ADMIN — SODIUM CHLORIDE 125 ML/HR: 9 INJECTION, SOLUTION INTRAVENOUS at 08:16

## 2019-05-21 RX ADMIN — CYCLOPENTOLATE HYDROCHLORIDE 1 DROP: 10 SOLUTION/ DROPS OPHTHALMIC at 07:53

## 2019-12-27 PROBLEM — K62.5 RECTAL BLEEDING: Status: ACTIVE | Noted: 2019-12-27

## 2020-02-11 ENCOUNTER — ANNUAL EXAM (OUTPATIENT)
Dept: OBGYN CLINIC | Facility: CLINIC | Age: 60
End: 2020-02-11
Payer: COMMERCIAL

## 2020-02-11 VITALS
WEIGHT: 146 LBS | DIASTOLIC BLOOD PRESSURE: 80 MMHG | SYSTOLIC BLOOD PRESSURE: 130 MMHG | BODY MASS INDEX: 22.91 KG/M2 | HEIGHT: 67 IN

## 2020-02-11 DIAGNOSIS — Z12.31 ENCOUNTER FOR SCREENING MAMMOGRAM FOR MALIGNANT NEOPLASM OF BREAST: ICD-10-CM

## 2020-02-11 DIAGNOSIS — Z01.419 ENCNTR FOR GYN EXAM (GENERAL) (ROUTINE) W/O ABN FINDINGS: Primary | ICD-10-CM

## 2020-02-11 PROBLEM — E78.5 HYPERLIPIDEMIA: Status: ACTIVE | Noted: 2019-09-24

## 2020-02-11 PROBLEM — R00.2 PALPITATIONS: Status: ACTIVE | Noted: 2019-09-24

## 2020-02-11 PROBLEM — R94.31 ABNORMAL EKG: Status: ACTIVE | Noted: 2019-09-24

## 2020-02-11 PROCEDURE — 99396 PREV VISIT EST AGE 40-64: CPT | Performed by: PHYSICIAN ASSISTANT

## 2020-02-11 RX ORDER — BIMATOPROST 0.01 %
DROPS OPHTHALMIC (EYE)
COMMUNITY
Start: 2019-11-13

## 2020-02-11 RX ORDER — ATORVASTATIN CALCIUM 10 MG/1
TABLET, FILM COATED ORAL
COMMUNITY
Start: 2019-08-28

## 2020-02-11 NOTE — PROGRESS NOTES
Kavya Drysawyer   1960    CC:  Yearly exam    S:  64 y o  female here for yearly exam  She is postmenopausal and has had no vaginal bleeding  She denies vaginal discharge, itching, odor or dryness  Sexual activity: She is not sexually active without pain, bleeding or dryness  Last Pap: 1/15/18 neg/neg  Last Mammo:  7/15/19 neg  Last Colonoscopy:  8/7/15 neg (repeat 2020)    She has a remote history of cervical cryotherapy with all normal Pap testing since  She requests Pap testing today  We had a long discussion about Pap testing, HPV testing, and ASCCP guidelines for Pap testing  She is in agreement with Pap and HPV q 5 years       Family hx of breast cancer: mother  Family hx of ovarian cancer: no  Family hx of colon cancer: no  Family hx of endometrial cancer: mother (following tamoxifen for breast cancer)    Current Outpatient Medications:     co-enzyme Q-10 30 MG capsule, Take 200 mg by mouth daily , Disp: , Rfl:     Flaxseed, Linseed, (FLAXSEED OIL) 1000 MG CAPS, Take by mouth, Disp: , Rfl:     latanoprost (XALATAN) 0 005 % ophthalmic solution, Administer 1 drop to both eyes daily at bedtime , Disp: , Rfl:     multivitamin (THERAGRAN) TABS, Take 1 tablet by mouth daily, Disp: , Rfl:     atorvastatin (LIPITOR) 10 mg tablet, Take 10 mg by mouth daily at bedtime , Disp: , Rfl:   Social History     Socioeconomic History    Marital status: /Civil Union     Spouse name: Not on file    Number of children: Not on file    Years of education: Not on file    Highest education level: Not on file   Occupational History    Not on file   Social Needs    Financial resource strain: Not on file    Food insecurity:     Worry: Not on file     Inability: Not on file    Transportation needs:     Medical: Not on file     Non-medical: Not on file   Tobacco Use    Smoking status: Former Smoker    Smokeless tobacco: Never Used   Substance and Sexual Activity    Alcohol use: Yes     Comment: socially  Drug use: No    Sexual activity: Not Currently   Lifestyle    Physical activity:     Days per week: Not on file     Minutes per session: Not on file    Stress: Not on file   Relationships    Social connections:     Talks on phone: Not on file     Gets together: Not on file     Attends Restorationist service: Not on file     Active member of club or organization: Not on file     Attends meetings of clubs or organizations: Not on file     Relationship status: Not on file    Intimate partner violence:     Fear of current or ex partner: Not on file     Emotionally abused: Not on file     Physically abused: Not on file     Forced sexual activity: Not on file   Other Topics Concern    Not on file   Social History Narrative    Not on file     Family History   Problem Relation Age of Onset    Breast cancer Mother     Uterine cancer Mother     Throat cancer Father     Lupus Maternal Grandmother     Stroke Maternal Grandfather     Colon cancer Neg Hx      Past Medical History:   Diagnosis Date    Cervical spinal stenosis     Chronic pain disorder     knee, right    Glaucoma     Herpes     High cholesterol         Review of Systems   Respiratory: Negative  Cardiovascular: Negative  Gastrointestinal: Negative for constipation and diarrhea  Genitourinary: Negative for difficulty urinating, pelvic pain, vaginal bleeding, vaginal discharge, itching or odor  O:  Blood pressure 130/80, height 5' 6 54" (1 69 m), weight 66 2 kg (146 lb)  Patient appears well and is not in distress  Neck is supple without masses  Breasts are symmetrical without mass, tenderness, nipple discharge, skin changes or adenopathy  Abdomen is soft and nontender without masses  External genitals are normal without lesions or rashes  Urethral meatus and urethra are normal  Bladder is normal to palpation  Vagina is normal without discharge or bleeding  Cervix is normal without discharge or lesion     Uterus is normal, mobile, nontender without palpable mass  Adnexa are normal, nontender, without palpable mass  A:  Yearly exam      P:   Pap 2023  Mammo slip given   Colonoscopy due 2020     RTO one year for yearly exam or sooner as needed

## 2020-07-14 ENCOUNTER — TRANSCRIBE ORDERS (OUTPATIENT)
Dept: ADMINISTRATIVE | Facility: HOSPITAL | Age: 60
End: 2020-07-14

## 2020-07-14 DIAGNOSIS — Z12.31 ENCOUNTER FOR SCREENING MAMMOGRAM FOR MALIGNANT NEOPLASM OF BREAST: Primary | ICD-10-CM

## 2020-08-26 ENCOUNTER — HOSPITAL ENCOUNTER (OUTPATIENT)
Dept: RADIOLOGY | Facility: HOSPITAL | Age: 60
Discharge: HOME | End: 2020-08-26
Payer: COMMERCIAL

## 2020-08-26 VITALS — WEIGHT: 140 LBS | BODY MASS INDEX: 21.97 KG/M2 | HEIGHT: 67 IN

## 2020-08-26 DIAGNOSIS — Z12.31 ENCOUNTER FOR SCREENING MAMMOGRAM FOR MALIGNANT NEOPLASM OF BREAST: ICD-10-CM

## 2020-08-26 PROCEDURE — 77067 SCR MAMMO BI INCL CAD: CPT

## 2020-08-26 PROCEDURE — 77063 BREAST TOMOSYNTHESIS BI: CPT

## 2020-09-01 PROBLEM — J30.9 ALLERGIC RHINITIS: Status: ACTIVE | Noted: 2020-09-01

## 2020-09-01 PROBLEM — H69.93 ETD (EUSTACHIAN TUBE DYSFUNCTION), BILATERAL: Status: ACTIVE | Noted: 2020-09-01

## 2020-09-01 PROBLEM — H69.83 ETD (EUSTACHIAN TUBE DYSFUNCTION), BILATERAL: Status: ACTIVE | Noted: 2020-09-01

## 2020-09-01 PROBLEM — H92.01 OTALGIA, RIGHT: Status: ACTIVE | Noted: 2020-09-01

## 2020-10-20 DIAGNOSIS — B00.9 HERPES: Primary | ICD-10-CM

## 2020-10-20 RX ORDER — VALACYCLOVIR HYDROCHLORIDE 500 MG/1
TABLET, FILM COATED ORAL
Qty: 30 TABLET | Refills: 0 | OUTPATIENT
Start: 2020-10-20 | End: 2021-02-16 | Stop reason: SDUPTHER

## 2021-02-05 ENCOUNTER — IMMUNIZATIONS (OUTPATIENT)
Dept: FAMILY MEDICINE CLINIC | Facility: HOSPITAL | Age: 61
End: 2021-02-05

## 2021-02-05 DIAGNOSIS — Z23 ENCOUNTER FOR IMMUNIZATION: Primary | ICD-10-CM

## 2021-02-05 PROCEDURE — 0011A SARS-COV-2 / COVID-19 MRNA VACCINE (MODERNA) 100 MCG: CPT

## 2021-02-05 PROCEDURE — 91301 SARS-COV-2 / COVID-19 MRNA VACCINE (MODERNA) 100 MCG: CPT

## 2021-02-16 ENCOUNTER — ANNUAL EXAM (OUTPATIENT)
Dept: OBGYN CLINIC | Facility: CLINIC | Age: 61
End: 2021-02-16
Payer: COMMERCIAL

## 2021-02-16 VITALS
DIASTOLIC BLOOD PRESSURE: 78 MMHG | HEIGHT: 67 IN | SYSTOLIC BLOOD PRESSURE: 120 MMHG | BODY MASS INDEX: 21.97 KG/M2 | WEIGHT: 140 LBS

## 2021-02-16 DIAGNOSIS — B00.9 HERPES: ICD-10-CM

## 2021-02-16 DIAGNOSIS — Z01.419 ENCNTR FOR GYN EXAM (GENERAL) (ROUTINE) W/O ABN FINDINGS: Primary | ICD-10-CM

## 2021-02-16 DIAGNOSIS — Z12.31 ENCOUNTER FOR SCREENING MAMMOGRAM FOR MALIGNANT NEOPLASM OF BREAST: ICD-10-CM

## 2021-02-16 PROCEDURE — S0612 ANNUAL GYNECOLOGICAL EXAMINA: HCPCS | Performed by: PHYSICIAN ASSISTANT

## 2021-02-16 PROCEDURE — G0145 SCR C/V CYTO,THINLAYER,RESCR: HCPCS | Performed by: PHYSICIAN ASSISTANT

## 2021-02-16 PROCEDURE — 87624 HPV HI-RISK TYP POOLED RSLT: CPT | Performed by: PHYSICIAN ASSISTANT

## 2021-02-16 RX ORDER — VALACYCLOVIR HYDROCHLORIDE 500 MG/1
TABLET, FILM COATED ORAL
Qty: 30 TABLET | Refills: 0 | Status: SHIPPED | OUTPATIENT
Start: 2021-02-16 | End: 2022-07-05 | Stop reason: SDUPTHER

## 2021-02-16 NOTE — PROGRESS NOTES
Anderson Marcos   1960    CC:  Yearly exam    S:  64 y o  female here for yearly exam  She is postmenopausal and has had no vaginal bleeding  She denies vaginal discharge, itching, odor or dryness  Sexual activity: She is not sexually active with a partner  She and her  are mostly cohabitating at this point  Last Pap: 1/15/18 neg/neg  Last Mammo: 8/26/20 neg  Last Colonoscopy:  11/19/20 polyp - repeat 3 years    We reviewed [facility] guidelines for Pap testing  She has a remote history of an abnormal Pap treated with cervical cryo; she requests Pap testing today       Family hx of breast cancer: cousin, mother (48)  Family hx of ovarian cancer: no  Family hx of colon cancer: no  Family hx of uterine cancer: mother     Current Outpatient Medications:     Apple Cider Vinegar 188 MG CAPS, Take by mouth, Disp: , Rfl:     atorvastatin (LIPITOR) 10 mg tablet, TAKE 1 TABLET NIGHTLY, Disp: , Rfl:     co-enzyme Q-10 30 MG capsule, Take 200 mg by mouth daily , Disp: , Rfl:     Flaxseed, Linseed, (FLAXSEED OIL) 1000 MG CAPS, Take by mouth, Disp: , Rfl:     LUMIGAN 0 01 % ophthalmic drops, , Disp: , Rfl:     multivitamin (THERAGRAN) TABS, Take 1 tablet by mouth daily, Disp: , Rfl:     Turmeric 1053 MG TABS, Take by mouth, Disp: , Rfl:     valACYclovir (VALTREX) 500 mg tablet, Bid x 3 days, Disp: 30 tablet, Rfl: 0  Social History     Socioeconomic History    Marital status: /Civil Union     Spouse name: Not on file    Number of children: Not on file    Years of education: Not on file    Highest education level: Not on file   Occupational History    Not on file   Social Needs    Financial resource strain: Not on file    Food insecurity     Worry: Not on file     Inability: Not on file   [employer] needs     Medical: Not on file     Non-medical: Not on file   Tobacco Use    Smoking status: Former Smoker     Types: Cigarettes    Smokeless tobacco: Never Used   Substance and Sexual Activity    Alcohol use: Yes     Frequency: 2-4 times a month     Drinks per session: 1 or 2     Binge frequency: Never     Comment: socially     Drug use: No    Sexual activity: Not Currently   Lifestyle    Physical activity     Days per week: Not on file     Minutes per session: Not on file    Stress: Not on file   Relationships    Social connections     Talks on phone: Not on file     Gets together: Not on file     Attends Cheondoism service: Not on file     Active member of club or organization: Not on file     Attends meetings of clubs or organizations: Not on file     Relationship status: Not on file    Intimate partner violence     Fear of current or ex partner: Not on file     Emotionally abused: Not on file     Physically abused: Not on file     Forced sexual activity: Not on file   Other Topics Concern    Not on file   Social History Narrative    Not on file     Family History   Problem Relation Age of Onset    Breast cancer Mother 48    Uterine cancer Mother     Throat cancer Father     Lupus Maternal Grandmother     Stroke Maternal Grandfather     Breast cancer Cousin 32    Colon cancer Neg Hx      Past Medical History:   Diagnosis Date    Cervical spinal stenosis     Chronic pain disorder     knee, right    Ear problems     Glaucoma     Herpes     High cholesterol         Review of Systems   Respiratory: Negative  Cardiovascular: Negative  Gastrointestinal: Negative for constipation and diarrhea  Genitourinary: Negative for difficulty urinating, pelvic pain, vaginal bleeding, vaginal discharge, itching or odor  O:  Blood pressure 120/78, height 5' 6 54" (1 69 m), weight 63 5 kg (140 lb)  Patient appears well and is not in distress  Neck is supple without masses  Breasts are symmetrical without mass, tenderness, nipple discharge, skin changes or adenopathy  Abdomen is soft and nontender without masses     External genitals are normal without lesions or rashes  Urethral meatus and urethra are normal  Bladder is normal to palpation  Vagina is normal without discharge or bleeding  Cervix is normal without discharge or lesion  Uterus is normal, mobile, nontender without palpable mass  Adnexa are normal, nontender, without palpable mass  A:  Yearly exam      P:   Pap 2023  Mammo slip given   Colonoscopy due 3 years    RTO one year for yearly exam or sooner as needed

## 2021-02-21 LAB
HPV HR 12 DNA CVX QL NAA+PROBE: NEGATIVE
HPV16 DNA CVX QL NAA+PROBE: NEGATIVE
HPV18 DNA CVX QL NAA+PROBE: NEGATIVE

## 2021-02-23 LAB
LAB AP GYN PRIMARY INTERPRETATION: NORMAL
Lab: NORMAL

## 2021-03-03 ENCOUNTER — IMMUNIZATIONS (OUTPATIENT)
Dept: FAMILY MEDICINE CLINIC | Facility: HOSPITAL | Age: 61
End: 2021-03-03

## 2021-03-03 DIAGNOSIS — Z23 ENCOUNTER FOR IMMUNIZATION: Primary | ICD-10-CM

## 2021-03-03 PROCEDURE — 91301 SARS-COV-2 / COVID-19 MRNA VACCINE (MODERNA) 100 MCG: CPT

## 2021-03-03 PROCEDURE — 0012A SARS-COV-2 / COVID-19 MRNA VACCINE (MODERNA) 100 MCG: CPT

## 2021-05-05 ENCOUNTER — TRANSCRIBE ORDERS (OUTPATIENT)
Dept: ADMINISTRATIVE | Facility: HOSPITAL | Age: 61
End: 2021-05-05

## 2021-05-05 ENCOUNTER — OFFICE VISIT (OUTPATIENT)
Dept: OBGYN CLINIC | Facility: CLINIC | Age: 61
End: 2021-05-05
Payer: COMMERCIAL

## 2021-05-05 VITALS — BODY MASS INDEX: 21.5 KG/M2 | SYSTOLIC BLOOD PRESSURE: 140 MMHG | WEIGHT: 135.4 LBS | DIASTOLIC BLOOD PRESSURE: 84 MMHG

## 2021-05-05 DIAGNOSIS — N90.89 VULVAR LESION: Primary | ICD-10-CM

## 2021-05-05 DIAGNOSIS — Z12.31 ENCOUNTER FOR SCREENING MAMMOGRAM FOR MALIGNANT NEOPLASM OF BREAST: Primary | ICD-10-CM

## 2021-05-05 PROCEDURE — 99213 OFFICE O/P EST LOW 20 MIN: CPT | Performed by: PHYSICIAN ASSISTANT

## 2021-05-05 PROCEDURE — 1036F TOBACCO NON-USER: CPT | Performed by: PHYSICIAN ASSISTANT

## 2021-05-05 RX ORDER — CYCLOSPORINE 0.5 MG/ML
EMULSION OPHTHALMIC
COMMUNITY
Start: 2021-03-29

## 2021-05-05 NOTE — PROGRESS NOTES
Ariana Juárez  1960    S:  64 y o  female here for a problem visit  She started Sunday with tingling on her vulva that felt similar to her history of HSV  She notes two sore areas that are tender to the touch, up near the clitoris  She started Valtrex 500mg po BID x 3 days starting on Sunday - she thinks it is improving now  Her last episode was 6 months ago and was relieved by a 3 day course of Valtrex  She did have her COVID vaccines in February and March and we discussed how this could ramp up her immune system, causing some odd symptoms       Past Medical History:   Diagnosis Date    Cervical spinal stenosis     Chronic pain disorder     knee, right    Ear problems     Glaucoma     Herpes     High cholesterol      Family History   Problem Relation Age of Onset    Breast cancer Mother 48    Uterine cancer Mother     Throat cancer Father     Lupus Maternal Grandmother     Stroke Maternal Grandfather     Breast cancer Cousin 32    Colon cancer Neg Hx      Social History     Socioeconomic History    Marital status: /Civil Union     Spouse name: None    Number of children: None    Years of education: None    Highest education level: None   Occupational History    None   Social Needs    Financial resource strain: None    Food insecurity     Worry: None     Inability: None    Transportation needs     Medical: None     Non-medical: None   Tobacco Use    Smoking status: Former Smoker     Types: Cigarettes    Smokeless tobacco: Never Used   Substance and Sexual Activity    Alcohol use: Yes     Frequency: 2-4 times a month     Drinks per session: 1 or 2     Binge frequency: Never     Comment: socially     Drug use: No    Sexual activity: Not Currently   Lifestyle    Physical activity     Days per week: None     Minutes per session: None    Stress: None   Relationships    Social connections     Talks on phone: None     Gets together: None     Attends Congregational service: None     Active member of club or organization: None     Attends meetings of clubs or organizations: None     Relationship status: None    Intimate partner violence     Fear of current or ex partner: None     Emotionally abused: None     Physically abused: None     Forced sexual activity: None   Other Topics Concern    None   Social History Narrative    None       Review of Systems   Respiratory: Negative  Cardiovascular: Negative  Gastrointestinal: Negative for constipation and diarrhea  Genitourinary: Negative for difficulty urinating, pelvic pain, vaginal bleeding, vaginal discharge, itching or odor  O:  /84 (BP Location: Right arm, Patient Position: Sitting, Cuff Size: Standard)   Wt 61 4 kg (135 lb 6 4 oz)   BMI 21 50 kg/m²   She appears well and is in no distress  Abdomen is soft and nontender  External genitals show two scabbed areas on the clitoral cee; this area is tender  A/P: Vulvar lesion, likely resolving HSV  Coconut oil topically to the area  Call in 2 weeks if not 100% improved

## 2021-08-27 ENCOUNTER — HOSPITAL ENCOUNTER (OUTPATIENT)
Dept: RADIOLOGY | Facility: HOSPITAL | Age: 61
Discharge: HOME | End: 2021-08-27
Payer: COMMERCIAL

## 2021-08-27 VITALS — WEIGHT: 140 LBS | HEIGHT: 67 IN | BODY MASS INDEX: 21.97 KG/M2

## 2021-08-27 DIAGNOSIS — Z12.31 ENCOUNTER FOR SCREENING MAMMOGRAM FOR MALIGNANT NEOPLASM OF BREAST: ICD-10-CM

## 2021-08-27 PROCEDURE — 77067 SCR MAMMO BI INCL CAD: CPT

## 2021-08-27 PROCEDURE — 77063 BREAST TOMOSYNTHESIS BI: CPT

## 2021-11-03 ENCOUNTER — IMMUNIZATIONS (OUTPATIENT)
Dept: FAMILY MEDICINE CLINIC | Facility: HOSPITAL | Age: 61
End: 2021-11-03

## 2021-11-03 DIAGNOSIS — Z23 ENCOUNTER FOR IMMUNIZATION: Primary | ICD-10-CM

## 2021-11-03 PROCEDURE — 91306 COVID-19 MODERNA VACC 0.25 ML BOOSTER: CPT

## 2021-11-03 PROCEDURE — 0013A COVID-19 MODERNA VACC 0.25 ML BOOSTER: CPT

## 2022-02-22 ENCOUNTER — ANNUAL EXAM (OUTPATIENT)
Dept: OBGYN CLINIC | Facility: CLINIC | Age: 62
End: 2022-02-22
Payer: COMMERCIAL

## 2022-02-22 VITALS
SYSTOLIC BLOOD PRESSURE: 152 MMHG | DIASTOLIC BLOOD PRESSURE: 88 MMHG | WEIGHT: 149 LBS | BODY MASS INDEX: 23.95 KG/M2 | HEIGHT: 66 IN

## 2022-02-22 DIAGNOSIS — Z01.419 ENCNTR FOR GYN EXAM (GENERAL) (ROUTINE) W/O ABN FINDINGS: Primary | ICD-10-CM

## 2022-02-22 DIAGNOSIS — Z12.31 ENCOUNTER FOR SCREENING MAMMOGRAM FOR MALIGNANT NEOPLASM OF BREAST: ICD-10-CM

## 2022-02-22 PROCEDURE — S0612 ANNUAL GYNECOLOGICAL EXAMINA: HCPCS | Performed by: PHYSICIAN ASSISTANT

## 2022-02-22 RX ORDER — MELOXICAM 15 MG/1
TABLET ORAL
COMMUNITY
Start: 2022-02-03

## 2022-02-22 NOTE — PROGRESS NOTES
Meg Mary Lou   1960    CC:  Yearly exam    S:  63 y o  female here for yearly exam  She is postmenopausal and has had no vaginal bleeding  She denies vaginal discharge, itching, odor or dryness  Sexual activity: She is not sexually active due to no partner  Last Pap: 2/16/21 neg/neg  Last Mammo: 8/27/21 neg  Last Colonoscopy:  2020 - repeat 3 years      We reviewed [facility] guidelines for Pap testing       Family hx of breast cancer: cousin, mother (48)  Family hx of ovarian cancer: no  Family hx of colon cancer: no      Current Outpatient Medications:     Apple Cider Vinegar 188 MG CAPS, Take by mouth, Disp: , Rfl:     atorvastatin (LIPITOR) 10 mg tablet, TAKE 1 TABLET NIGHTLY, Disp: , Rfl:     co-enzyme Q-10 30 MG capsule, Take 200 mg by mouth daily , Disp: , Rfl:     Flaxseed, Linseed, (FLAXSEED OIL) 1000 MG CAPS, Take by mouth, Disp: , Rfl:     LUMIGAN 0 01 % ophthalmic drops, , Disp: , Rfl:     meloxicam (MOBIC) 15 mg tablet, , Disp: , Rfl:     multivitamin (THERAGRAN) TABS, Take 1 tablet by mouth daily, Disp: , Rfl:     Restasis 0 05 % ophthalmic emulsion, INSTILL 1 DROP INTO LEFT EYE EVERY 12 HOURS, Disp: , Rfl:     Turmeric 1053 MG TABS, Take by mouth, Disp: , Rfl:     valACYclovir (VALTREX) 500 mg tablet, Bid x 3 days, Disp: 30 tablet, Rfl: 0  Social History     Socioeconomic History    Marital status: /Civil Union     Spouse name: Not on file    Number of children: Not on file    Years of education: Not on file    Highest education level: Not on file   Occupational History    Not on file   Tobacco Use    Smoking status: Former Smoker     Types: Cigarettes    Smokeless tobacco: Never Used   Vaping Use    Vaping Use: Never used   Substance and Sexual Activity    Alcohol use: Yes     Comment: socially     Drug use: No    Sexual activity: Not Currently   Other Topics Concern    Not on file   Social History Narrative    Not on file     Social Determinants of Health Financial Resource Strain: Not on file   Food Insecurity: Not on file   Transportation Needs: Not on file   Physical Activity: Not on file   Stress: Not on file   Social Connections: Not on file   Intimate Partner Violence: Not on file   Housing Stability: Not on file     Family History   Problem Relation Age of Onset    Breast cancer Mother 48    Uterine cancer Mother     Throat cancer Father     Lupus Maternal Grandmother     Stroke Maternal Grandfather     Breast cancer Cousin 32    Colon cancer Neg Hx      Past Medical History:   Diagnosis Date    Cervical spinal stenosis     Chronic pain disorder     knee, right    Ear problems     Glaucoma     Herpes     High cholesterol         Review of Systems   Respiratory: Negative  Cardiovascular: Negative  Gastrointestinal: Negative for constipation and diarrhea  Genitourinary: Negative for difficulty urinating, pelvic pain, vaginal bleeding, vaginal discharge, itching or odor  O:  Blood pressure 152/88, height 5' 6 14" (1 68 m), weight 67 6 kg (149 lb)  Patient appears well and is not in distress  Neck is supple without masses  Breasts are symmetrical without mass, tenderness, nipple discharge, skin changes or adenopathy  Abdomen is soft and nontender without masses  External genitals are normal without lesions or rashes  Urethral meatus and urethra are normal  Bladder is normal to palpation  Vagina is normal without discharge or bleeding  Cervix is normal without discharge or lesion  Uterus is normal, mobile, nontender without palpable mass  Adnexa are normal, nontender, without palpable mass  A:  Yearly exam      P:   Pap 2026   Mammo slip given   Colonoscopy due 2023      RTO one year for yearly exam or sooner as needed

## 2022-07-05 ENCOUNTER — TELEPHONE (OUTPATIENT)
Dept: OBGYN CLINIC | Facility: CLINIC | Age: 62
End: 2022-07-05

## 2022-07-05 DIAGNOSIS — B00.9 HERPES: Primary | ICD-10-CM

## 2022-07-05 RX ORDER — VALACYCLOVIR HYDROCHLORIDE 500 MG/1
TABLET, FILM COATED ORAL
Qty: 30 TABLET | Refills: 0 | Status: SHIPPED | OUTPATIENT
Start: 2022-07-05 | End: 2023-07-11

## 2022-08-29 ENCOUNTER — HOSPITAL ENCOUNTER (OUTPATIENT)
Dept: RADIOLOGY | Facility: HOSPITAL | Age: 62
Discharge: HOME | End: 2022-08-29
Payer: COMMERCIAL

## 2022-08-29 VITALS — WEIGHT: 140 LBS | BODY MASS INDEX: 22.5 KG/M2 | HEIGHT: 66 IN

## 2022-08-29 DIAGNOSIS — Z12.31 ENCOUNTER FOR SCREENING MAMMOGRAM FOR MALIGNANT NEOPLASM OF BREAST: ICD-10-CM

## 2022-08-29 PROCEDURE — 77063 BREAST TOMOSYNTHESIS BI: CPT

## 2022-08-29 PROCEDURE — 77067 SCR MAMMO BI INCL CAD: CPT

## 2023-01-06 NOTE — PROGRESS NOTES
Colon and Rectal Surgery   Katarzyna Bertrand 63 y o  female MRN [id number]  Encounter: [id number]  01/11/23 2:26 PM            Assessment: Katarzyna Bertrand is a 63 y o  female who had a sebaceous cyst       Plan:   Sebaceous cyst  The head of the left lateral sebaceous cyst that was quite small, measuring less than 10 mm in diameter  A curvilinear incision was created on either side of it and to centimeter wound was created  The skin was grasped and the cyst was excised completely along with the skin  The skin was then approximated using 3 interrupted 4-0 Vicryl sutures  She tolerated the procedure very well under local anesthesia  A total of 6 mL of 1% Xylocaine was used  She is counseled to return as needed  The specimen was sent for pathologic evaluation  Subjective     HPI    Katarzyna Bertrand is a 63 y o  female who is here today for evaluation of an anal lump  The patient reports an anal lump she first noticed around 3 weeks ago and which gets irritated with bowel movements; notes the lump seems to be decreasing in size  The patient denies any current rectal bleeding; states she has not had any bleeding for a few months  Previously seen in the office on 12/27/2019 for evaluation of rectal bleeding  Her most recent colonoscopy on 11/19/2020, which showed: 1) 1 cm polyp in the transverse colon (Tubular adenoma)  12)   1 cm polyp in the rectum (Hyperplastic)  3 year colonoscopy recall  Historical Information   Past Medical History:   Diagnosis Date   • Cervical spinal stenosis    • Chronic pain disorder     knee, right   • Ear problems    • Glaucoma    • Herpes    • High cholesterol      Past Surgical History:   Procedure Laterality Date   • CATARACT EXTRACTION Bilateral 2019   • COLONOSCOPY     • FOOT SURGERY     • KNEE ARTHROSCOPY W/ MENISCAL REPAIR     • IN XCAPSL CTRC RMVL INSJ IO LENS PROSTH W/O ECP Right 5/14/2019    Procedure: PHACO W/IOL (TORIC LENS);   Surgeon: Socorro Moise MD Tre;  Location: [address] [place] [address] OR;  Service: Ophthalmology   • NV XCAPSL CTRC RMVL INSJ IO LENS PROSTH W/O ECP Left 5/21/2019    Procedure: PHACO W/IOL (TORIC LENS); Surgeon: Forrest Rodriguez MD;  Location: [address] [place] [address];  Service: Ophthalmology   • TUBAL LIGATION         Meds/Allergies       Current Outpatient Medications:   •  Apple Cider Vinegar 188 MG CAPS, Take by mouth, Disp: , Rfl:   •  Flaxseed, Linseed, (FLAXSEED OIL) 1000 MG CAPS, Take by mouth, Disp: , Rfl:   •  LUMIGAN 0 01 % ophthalmic drops, , Disp: , Rfl:   •  multivitamin (THERAGRAN) TABS, Take 1 tablet by mouth daily, Disp: , Rfl:   •  Restasis 0 05 % ophthalmic emulsion, INSTILL 1 DROP INTO LEFT EYE EVERY 12 HOURS, Disp: , Rfl:   •  atorvastatin (LIPITOR) 10 mg tablet, TAKE 1 TABLET NIGHTLY, Disp: , Rfl:   •  co-enzyme Q-10 30 MG capsule, Take 200 mg by mouth daily , Disp: , Rfl:   •  meloxicam (MOBIC) 15 mg tablet, , Disp: , Rfl:   •  Turmeric 1053 MG TABS, Take by mouth, Disp: , Rfl:   •  valACYclovir (VALTREX) 500 mg tablet, Bid x 3 days, Disp: 30 tablet, Rfl: 0  No Known Allergies    Social History   Social History     Substance and Sexual Activity   Drug Use No     Social History     Tobacco Use   Smoking Status Former   • Types: Cigarettes   Smokeless Tobacco Never         Family History   Problem Relation Age of Onset   • Breast cancer Mother 48   • Uterine cancer Mother    • Throat cancer Father    • Lupus Maternal Grandmother    • Stroke Maternal Grandfather    • Breast cancer Cousin 32   • Colon cancer Neg Hx          Review of Systems    Objective   Current Vitals:  Vitals:    01/11/23 1358   Weight: 61 2 kg (135 lb)   Height: 5' 6" (1 676 m)         Physical Exam  Constitutional:       Appearance: Normal appearance  Genitourinary:     Comments: LL 1 cm sebaceous cyst  Neurological:      General: No focal deficit present  Mental Status: She is alert and oriented to person, place, and time

## 2023-01-11 ENCOUNTER — OFFICE VISIT (OUTPATIENT)
Age: 63
End: 2023-01-11

## 2023-01-11 VITALS — BODY MASS INDEX: 21.69 KG/M2 | WEIGHT: 135 LBS | HEIGHT: 66 IN

## 2023-01-11 DIAGNOSIS — L72.3 SEBACEOUS CYST: Primary | ICD-10-CM

## 2023-01-11 NOTE — ASSESSMENT & PLAN NOTE
The head of the left lateral sebaceous cyst that was quite small, measuring less than 10 mm in diameter  A curvilinear incision was created on either side of it and to centimeter wound was created  The skin was grasped and the cyst was excised completely along with the skin  The skin was then approximated using 3 interrupted 4-0 Vicryl sutures  She tolerated the procedure very well under local anesthesia  A total of 6 mL of 1% Xylocaine was used  She is counseled to return as needed  The specimen was sent for pathologic evaluation

## 2023-01-11 NOTE — PROGRESS NOTES
Biopsy    Date/Time: 1/11/2023 2:20 PM  Performed by: Bhavna Young MD  Authorized by: Bhavna Young MD   Universal Protocol:  Consent: Verbal consent obtained  Written consent obtained  Consent given by: patient  Patient understanding: patient states understanding of the procedure being performed      Procedure Details - Lesion Biopsy:     Biopsy tissue type: skin and subcutaneous    Final defect size (mm):  18    Malignancy: benign lesion       There is a left lateral perianal sebaceous cyst drink approximately 1 cm in diameter  This was done under local anesthesia after Betadine preparation  The wound was closed using 3 interrupted subcuticular 4-0 Vicryl sutures gauze was applied  Instructions were given

## 2023-02-13 NOTE — PROGRESS NOTES
Colon and Rectal Surgery   Chitra Hensley 63 y o  female MRN [id number]  Encounter: [id number]  02/15/23 4:30 PM            Assessment: Chitra Hensley is a 63 y o  female who had excision of an anal cyst       Plan:   Sebaceous cyst  She had removal of a perianal cyst that was thought to be a sebaceous cyst   Pathology showed a material   This was probably a perianal gland with chronic obstruction that was relieved intermittently through the anal duct  I cannot be certain of this  The healed wound is quite unremarkable  She has no symptoms in the area  At this time, I recommend no further therapy or evaluation  She is to return should symptoms recur or for any suggestion of a painful mass or swelling in the area  Subjective     HPI    Chitra Hensley is a 63 y o  female who is here today for follow up  The patient denies any pain, bleeding or drainage from the surgery site, but notes some itching; having 1 soft and formed bowel movement every other day  Last seen in the office on 1/11/2023 for evaluation of sebaceous cyst, which was then excised in the office  Her most recent colonoscopy on 11/19/2020, which showed: 1) 1 cm polyp in the transverse colon (Tubular adenoma)  12)   1 cm polyp in the rectum (Hyperplastic)  3 year colonoscopy recall  Historical Information   Past Medical History:   Diagnosis Date   • Cervical spinal stenosis    • Chronic pain disorder     knee, right   • Ear problems    • Glaucoma    • Herpes    • High cholesterol      Past Surgical History:   Procedure Laterality Date   • CATARACT EXTRACTION Bilateral 2019   • COLONOSCOPY     • FOOT SURGERY     • KNEE ARTHROSCOPY W/ MENISCAL REPAIR     • OK XCAPSL CTRC RMVL INSJ IO LENS PROSTH W/O ECP Right 5/14/2019    Procedure: PHACO W/IOL (TORIC LENS);   Surgeon: Jackie Petit MD;  Location: [address] [place] [address];  Service: Ophthalmology   • OK XCAPSL CTRC RMVL INSJ IO LENS PROSTH W/O ECP Left 5/21/2019    Procedure: DR GENEVIEVE SNOW [facility] W/IOL (TORIC LENS); Surgeon: Luis Alfredo Cross MD;  Location: [facility] MAIN OR;  Service: Ophthalmology   • TUBAL LIGATION         Meds/Allergies       Current Outpatient Medications:   •  Apple Cider Vinegar 188 MG CAPS, Take by mouth, Disp: , Rfl:   •  LUMIGAN 0 01 % ophthalmic drops, , Disp: , Rfl:   •  Restasis 0 05 % ophthalmic emulsion, INSTILL 1 DROP INTO LEFT EYE EVERY 12 HOURS, Disp: , Rfl:   •  atorvastatin (LIPITOR) 10 mg tablet, TAKE 1 TABLET NIGHTLY, Disp: , Rfl:   •  co-enzyme Q-10 30 MG capsule, Take 200 mg by mouth daily , Disp: , Rfl:   •  Flaxseed, Linseed, (FLAXSEED OIL) 1000 MG CAPS, Take by mouth (Patient not taking: Reported on 2/15/2023), Disp: , Rfl:   •  meloxicam (MOBIC) 15 mg tablet, , Disp: , Rfl:   •  multivitamin (THERAGRAN) TABS, Take 1 tablet by mouth daily (Patient not taking: Reported on 2/15/2023), Disp: , Rfl:   •  Turmeric 1053 MG TABS, Take by mouth, Disp: , Rfl:   •  valACYclovir (VALTREX) 500 mg tablet, Bid x 3 days, Disp: 30 tablet, Rfl: 0  No Known Allergies    Social History   Social History     Substance and Sexual Activity   Drug Use No     Social History     Tobacco Use   Smoking Status Former   • Types: Cigarettes   Smokeless Tobacco Never         Family History   Problem Relation Age of Onset   • Breast cancer Mother 48   • Uterine cancer Mother    • Throat cancer Father    • Lupus Maternal Grandmother    • Stroke Maternal Grandfather    • Breast cancer Cousin 32   • Colon cancer Neg Hx          Review of Systems    Objective   Current Vitals:  Vitals:    02/15/23 1611   Weight: 62 6 kg (138 lb)   Height: 5' 6" (1 676 m)         Physical Exam  Genitourinary:     Comments: Well-healing perianal wound

## 2023-02-15 ENCOUNTER — OFFICE VISIT (OUTPATIENT)
Age: 63
End: 2023-02-15

## 2023-02-15 VITALS — WEIGHT: 138 LBS | BODY MASS INDEX: 22.18 KG/M2 | HEIGHT: 66 IN

## 2023-02-15 DIAGNOSIS — L72.3 SEBACEOUS CYST: Primary | ICD-10-CM

## 2023-02-15 NOTE — ASSESSMENT & PLAN NOTE
She had removal of a perianal cyst that was thought to be a sebaceous cyst   Pathology showed a material   This was probably a perianal gland with chronic obstruction that was relieved intermittently through the anal duct  I cannot be certain of this  The healed wound is quite unremarkable  She has no symptoms in the area  At this time, I recommend no further therapy or evaluation  She is to return should symptoms recur or for any suggestion of a painful mass or swelling in the area

## 2023-03-24 ENCOUNTER — ANNUAL EXAM (OUTPATIENT)
Dept: OBGYN CLINIC | Facility: CLINIC | Age: 63
End: 2023-03-24

## 2023-03-24 VITALS
HEIGHT: 66 IN | WEIGHT: 136.2 LBS | SYSTOLIC BLOOD PRESSURE: 130 MMHG | BODY MASS INDEX: 21.89 KG/M2 | DIASTOLIC BLOOD PRESSURE: 70 MMHG

## 2023-03-24 DIAGNOSIS — Z12.31 ENCOUNTER FOR SCREENING MAMMOGRAM FOR MALIGNANT NEOPLASM OF BREAST: ICD-10-CM

## 2023-03-24 DIAGNOSIS — Z01.419 ENCOUNTER FOR GYNECOLOGICAL EXAMINATION (GENERAL) (ROUTINE) WITHOUT ABNORMAL FINDINGS: Primary | ICD-10-CM

## 2023-03-24 PROBLEM — J30.9 ALLERGIC RHINITIS: Status: RESOLVED | Noted: 2020-09-01 | Resolved: 2023-03-24

## 2023-03-24 PROBLEM — M17.11 PRIMARY OSTEOARTHRITIS OF RIGHT KNEE: Status: ACTIVE | Noted: 2021-11-05

## 2023-03-24 RX ORDER — AMOXICILLIN 500 MG/1
CAPSULE ORAL
COMMUNITY
Start: 2023-02-09

## 2023-03-24 RX ORDER — DIAZEPAM 10 MG/1
TABLET ORAL
COMMUNITY
Start: 2023-03-09

## 2023-03-24 NOTE — PROGRESS NOTES
Meg Axon   1960    CC:  Yearly exam    S:  63 y o  female here for yearly exam  She is postmenopausal and has had no vaginal bleeding  She denies vaginal discharge, itching, odor or dryness  Sexual activity: She is not sexually active  Last Pap: 2021 - normal/negative HPV  Last Mammo: 2022 - BIRAD-2  Last Colonoscopy: 2/15/2023 - 3 year intervals 2/2 polyps  Last DEXA: never    We reviewed [facility] guidelines for Pap testing  Current Outpatient Medications:   •  amoxicillin (AMOXIL) 500 mg capsule, TAKE 1 CAPSULE BY MOUTH THREE TIMES DAILY UNTIL FINISHED   START 1 DAY BEFORE DENTAL SURGERY, Disp: , Rfl:   •  Apple Cider Vinegar 188 MG CAPS, Take by mouth, Disp: , Rfl:   •  diazepam (VALIUM) 10 mg tablet, TAKE 1 TABLET BY MOUTH 1 HOUR BEFORE BEDTIME AND 1 HOUR BEFORE APPT, Disp: , Rfl:   •  Flaxseed, Linseed, (FLAXSEED OIL) 1000 MG CAPS, Take by mouth, Disp: , Rfl:   •  multivitamin (THERAGRAN) TABS, Take 1 tablet by mouth daily, Disp: , Rfl:   •  Restasis 0 05 % ophthalmic emulsion, INSTILL 1 DROP INTO LEFT EYE EVERY 12 HOURS, Disp: , Rfl:   •  atorvastatin (LIPITOR) 10 mg tablet, TAKE 1 TABLET NIGHTLY, Disp: , Rfl:   •  co-enzyme Q-10 30 MG capsule, Take 200 mg by mouth daily , Disp: , Rfl:   •  LUMIGAN 0 01 % ophthalmic drops, , Disp: , Rfl:   •  meloxicam (MOBIC) 15 mg tablet, , Disp: , Rfl:   •  Turmeric 1053 MG TABS, Take by mouth, Disp: , Rfl:   •  valACYclovir (VALTREX) 500 mg tablet, Bid x 3 days, Disp: 30 tablet, Rfl: 0  Social History     Socioeconomic History   • Marital status: /Civil Union     Spouse name: Not on file   • Number of children: Not on file   • Years of education: Not on file   • Highest education level: Not on file   Occupational History   • Not on file   Tobacco Use   • Smoking status: Former     Types: Cigarettes     Quit date:      Years since quittin 2   • Smokeless tobacco: Never   Vaping Use   • Vaping Use: Never used   Substance and Sexual Activity   • Alcohol use: Yes     Comment: socially    • Drug use: No   • Sexual activity: Not Currently     Birth control/protection: Abstinence   Other Topics Concern   • Not on file   Social History Narrative   • Not on file     Social Determinants of Health     Financial Resource Strain: Not on file   Food Insecurity: Not on file   Transportation Needs: Not on file   Physical Activity: Not on file   Stress: Not on file   Social Connections: Not on file   Intimate Partner Violence: Not on file   Housing Stability: Not on file     Family History   Problem Relation Age of Onset   • Breast cancer Mother 48   • Uterine cancer Mother    • Throat cancer Father    • Lupus Maternal Grandmother    • Stroke Maternal Grandfather    • Breast cancer Cousin 26   • Colon cancer Neg Hx      Past Medical History:   Diagnosis Date   • Cervical spinal stenosis    • Chronic pain disorder     knee, right   • Ear problems    • Glaucoma    • Herpes    • High cholesterol         Review of Systems   Respiratory: Negative  Cardiovascular: Negative  Gastrointestinal: Negative for constipation and diarrhea  Genitourinary: Negative for difficulty urinating, pelvic pain, vaginal bleeding, vaginal discharge, itching or odor  O:  Blood pressure 130/70, height 5' 5 5" (1 664 m), weight 61 8 kg (136 lb 3 2 oz)  Patient appears well and is not in distress  Neck is supple without masses  Breasts are symmetrical without mass, tenderness, nipple discharge, skin changes or adenopathy  Abdomen is soft and nontender without masses  External genitals are normal without lesions or rashes  Urethral meatus and urethra are normal  Bladder is normal to palpation  Vagina is normal without discharge or bleeding  Cervix is normal without discharge or lesion  Uterus is normal, mobile, nontender without palpable mass  Adnexa are normal, nontender, without palpable mass       A:   Yearly exam      P:   Pap due 2026  Mammo slip given   Colonoscopy due 2026   DEXA due age 74; encouraged calcium/vitamin D, weight-bearing exercise and continued dental work (going through implants right now)    RTO one year for yearly exam or sooner as needed

## 2023-09-08 ENCOUNTER — HOSPITAL ENCOUNTER (OUTPATIENT)
Dept: RADIOLOGY | Age: 63
Discharge: HOME | End: 2023-09-08
Payer: COMMERCIAL

## 2023-09-08 VITALS — WEIGHT: 136 LBS | HEIGHT: 66 IN | BODY MASS INDEX: 21.86 KG/M2

## 2023-09-08 DIAGNOSIS — Z12.31 ENCOUNTER FOR SCREENING MAMMOGRAM FOR MALIGNANT NEOPLASM OF BREAST: ICD-10-CM

## 2023-09-08 PROCEDURE — 77067 SCR MAMMO BI INCL CAD: CPT

## 2023-09-08 PROCEDURE — 77063 BREAST TOMOSYNTHESIS BI: CPT

## 2024-04-02 ENCOUNTER — ANNUAL EXAM (OUTPATIENT)
Dept: OBGYN CLINIC | Facility: CLINIC | Age: 64
End: 2024-04-02
Payer: COMMERCIAL

## 2024-04-02 VITALS
SYSTOLIC BLOOD PRESSURE: 132 MMHG | WEIGHT: 137 LBS | HEIGHT: 66 IN | BODY MASS INDEX: 22.02 KG/M2 | DIASTOLIC BLOOD PRESSURE: 82 MMHG

## 2024-04-02 DIAGNOSIS — Z01.419 ENCOUNTER FOR GYNECOLOGICAL EXAMINATION WITHOUT ABNORMAL FINDING: Primary | ICD-10-CM

## 2024-04-02 PROCEDURE — S0612 ANNUAL GYNECOLOGICAL EXAMINA: HCPCS | Performed by: OBSTETRICS & GYNECOLOGY

## 2024-04-02 RX ORDER — ROSUVASTATIN CALCIUM 5 MG/1
5 TABLET, COATED ORAL DAILY
COMMUNITY
Start: 2023-05-16 | End: 2024-05-15

## 2024-04-02 NOTE — PROGRESS NOTES
Roseline Duong   1960    CC:  Yearly exam    S:  63 y.o. female here for yearly exam. She is postmenopausal and has had no vaginal bleeding.  She denies vaginal discharge, itching, odor or dryness.     She has been working on her cholesterol with her PCP.  Discussed plant-based dietary options as she does not like meat.      Sexual activity: She is not sexually active.     Last Pap: 2/16/2021 - normal/negative HPV  Last Mammo: 9/8/2023 - BIRAD-2  Last Colonoscopy: 11/19/2020 - 3 years  Last DEXA: never    We reviewed [facility] guidelines for Pap testing.       Current Outpatient Medications:     Flaxseed, Linseed, (FLAXSEED OIL) 1000 MG CAPS, Take by mouth, Disp: , Rfl:     multivitamin (THERAGRAN) TABS, Take 1 tablet by mouth daily, Disp: , Rfl:     Restasis 0.05 % ophthalmic emulsion, INSTILL 1 DROP INTO LEFT EYE EVERY 12 HOURS, Disp: , Rfl:     amoxicillin (AMOXIL) 500 mg capsule, TAKE 1 CAPSULE BY MOUTH THREE TIMES DAILY UNTIL FINISHED. START 1 DAY BEFORE DENTAL SURGERY, Disp: , Rfl:     Apple Cider Vinegar 188 MG CAPS, Take by mouth, Disp: , Rfl:     APPLE CIDER VINEGAR PO, Take by mouth (Patient not taking: Reported on 4/2/2024), Disp: , Rfl:     diazepam (VALIUM) 10 mg tablet, TAKE 1 TABLET BY MOUTH 1 HOUR BEFORE BEDTIME AND 1 HOUR BEFORE APPT, Disp: , Rfl:     rosuvastatin (CRESTOR) 5 mg tablet, Take 5 mg by mouth daily (Patient not taking: Reported on 4/2/2024), Disp: , Rfl:     valACYclovir (VALTREX) 500 mg tablet, Bid x 3 days, Disp: 30 tablet, Rfl: 0  Social History     Socioeconomic History    Marital status: /Civil Union     Spouse name: Not on file    Number of children: Not on file    Years of education: Not on file    Highest education level: Not on file   Occupational History    Not on file   Tobacco Use    Smoking status: Former     Current packs/day: 0.00     Average packs/day: 0.3 packs/day for 5.0 years (1.3 ttl pk-yrs)     Types: Cigarettes     Quit date: 2010     Years since quitting:  14.2    Smokeless tobacco: Never   Vaping Use    Vaping status: Never Used   Substance and Sexual Activity    Alcohol use: Yes     Comment: Social    Drug use: No    Sexual activity: Not Currently     Birth control/protection: Post-menopausal   Other Topics Concern    Not on file   Social History Narrative    Not on file     Social Determinants of Health     Financial Resource Strain: Low Risk  (5/8/2023)    Received from [facility]    Overall Financial Resource Strain (CARDIA)     Difficulty of Paying Living Expenses: Not hard at all   Food Insecurity: No Food Insecurity (5/8/2023)    Received from [facility]    Hunger Vital Sign     Worried About Running Out of Food in the Last Year: Never true     Ran Out of Food in the Last Year: Never true   Transportation Needs: No Transportation Needs (5/8/2023)    Received from [facility]    PRAPARE - Transportation     Lack of Transportation (Medical): No     Lack of Transportation (Non-Medical): No   Physical Activity: Not on file   Stress: Patient Declined (5/8/2023)    Received from [facility]    [place] [employer] - Occupational Stress Questionnaire     Feeling of Stress : Patient declined   Social Connections: Socially Integrated (5/8/2023)    Received from [facility]    Social Connection and Isolation Panel [NHANES]     Frequency of Communication with Friends and Family: More than three times a week     Frequency of Social Gatherings with Friends and Family: More than three times a week     Attends Hindu Services: More than 4 times per year     Active Member of Clubs or Organizations: Yes     Attends Club or Organization Meetings: More than 4 times per year     Marital Status:    Intimate Partner Violence: Not At Risk (5/8/2023)    Received from [facility]    Humiliation, Afraid, Rape, and Kick questionnaire     Fear of Current  "or Ex-Partner: No     Emotionally Abused: No     Physically Abused: No     Sexually Abused: No   Housing Stability: Low Risk  (5/8/2023)    Received from [facility]    Housing Stability Vital Sign     Unable to Pay for Housing in the Last Year: No     Number of Places Lived in the Last Year: 1     Unstable Housing in the Last Year: No     Family History   Problem Relation Age of Onset    Breast cancer Mother 53    Uterine cancer Mother 62    Throat cancer Father 62    No Known Problems Sister     No Known Problems Daughter     Lupus Maternal Grandmother     Stroke Maternal Grandfather     No Known Problems Paternal Grandmother     No Known Problems Paternal Grandfather     No Known Problems Son     Breast cancer Paternal Aunt 50    Lung cancer Paternal Aunt 70    Stomach cancer Paternal Aunt     Lung cancer Paternal Uncle 70    BRCA 1/2 Cousin         pt unsure of result-all cousin's sisters were tested    Breast cancer Cousin 26    Colon cancer Neg Hx      Past Medical History:   Diagnosis Date    Cervical spinal stenosis     Chronic pain disorder     knee, right    Ear problems     Glaucoma     Herpes     High cholesterol         Review of Systems   Respiratory: Negative.    Cardiovascular: Negative.    Gastrointestinal: Negative for constipation and diarrhea.   Genitourinary: Negative for difficulty urinating, pelvic pain, vaginal bleeding, vaginal discharge, itching or odor.    O:  Blood pressure 132/82, height 5' 6\" (1.676 m), weight 62.1 kg (137 lb).    Patient appears well and is not in distress  Neck is supple without masses  Breasts are symmetrical without mass, tenderness, nipple discharge, skin changes or adenopathy.   Abdomen is soft and nontender without masses.   External genitals are normal without lesions or rashes.  Urethral meatus and urethra are normal  Bladder is normal to palpation  Vagina is normal without discharge or bleeding.   Cervix is normal without discharge or lesion. "   Uterus is normal, mobile, nontender without palpable mass.  Adnexa are normal, nontender, without palpable mass.     A:   Yearly exam.     P:   Pap due 2026  Mammo scheduled   Colonoscopy due - patient encouraged to call   DEXA due age 67   Encouraged continued work on cholesterol with PCP    RTO one year for yearly exam or sooner as needed.

## 2024-04-17 ENCOUNTER — PREP FOR PROCEDURE (OUTPATIENT)
Age: 64
End: 2024-04-17

## 2024-04-17 ENCOUNTER — TELEPHONE (OUTPATIENT)
Age: 64
End: 2024-04-17

## 2024-04-17 DIAGNOSIS — Z86.010 HISTORY OF COLON POLYPS: Primary | ICD-10-CM

## 2024-04-17 NOTE — TELEPHONE ENCOUNTER
Scheduled date of colonoscopy (as of today):7/24/24  Physician performing colonoscopy:Dr. Palomares  Location of [facility]oscopy:[facility]  Bowel prep reviewed with patient:Jony/Dul prep instr sent to pts email [email or web address]   Instructions reviewed with patient by:ROSSY  Clearances: N/A

## 2024-04-17 NOTE — TELEPHONE ENCOUNTER
04/17/24  Screened by: Bertha Dejesus    Referring Provider Silvana Villaseñro    Pre- Screening:     There is no height or weight on file to calculate BMI.  Has patient been referred for a routine screening Colonoscopy? yes  Is the patient between 45-75 years old? yes      Previous Colonoscopy yes   If yes:    Date: 2020    Facility:     Reason:           Does the patient want to see a Gastroenterologist prior to their procedure OR are they having any GI symptoms? no    Has the patient been hospitalized or had abdominal surgery in the past 6 months? no    Does the patient use supplemental oxygen? no    Does the patient take Coumadin, Lovenox, Plavix, Elliquis, Xarelto, or other blood thinning medication? no    Has the patient had a stroke, cardiac event, or stent placed in the past year? no        If patient is between 45yrs - 49yrs, please advise patient that we will have to confirm benefits & coverage with their insurance company for a routine screening colonoscopy.

## 2024-07-10 ENCOUNTER — TELEPHONE (OUTPATIENT)
Age: 64
End: 2024-07-10

## 2024-07-10 ENCOUNTER — ANESTHESIA (OUTPATIENT)
Dept: ANESTHESIOLOGY | Facility: HOSPITAL | Age: 64
End: 2024-07-10

## 2024-07-10 ENCOUNTER — ANESTHESIA EVENT (OUTPATIENT)
Dept: ANESTHESIOLOGY | Facility: HOSPITAL | Age: 64
End: 2024-07-10

## 2024-08-08 ENCOUNTER — TELEPHONE (OUTPATIENT)
Dept: GASTROENTEROLOGY | Facility: CLINIC | Age: 64
End: 2024-08-08

## 2024-08-08 NOTE — TELEPHONE ENCOUNTER
Attempted call. Left voicemail for the patient to call the office back to discuss colonoscopy prep.     Unfortunately, the physicians at our group do not prescribe clenpiq.     The preparation for clenpiq also involves drinking a similar amount of liquid as our standard miralax/ducolax prep.   For clenpiq you have to drink two preparation bottles which are around 6 ounces each and around 70 ounces of clear liquid in two days.   Our miralax/ducolax preparation requires drinking 64 ounces of clear liquid in two days.     If patient returns call please review information above and send prep instructions for our miralax/ducolax prep.

## 2024-08-08 NOTE — TELEPHONE ENCOUNTER
Pt returned our call. Relayed message and pt verbalized understanding. Pt indicated that she has the Jony/dul prep instr already. No further questions.

## 2024-08-08 NOTE — TELEPHONE ENCOUNTER
Patients GI provider:  Dr. Palomares    Number to return call: [phone]    Reason for call: Pt is requesting Clenpiq for prep. States last time she got sick from all the fluid she drank. I did advise pt it may not be covered by insurance. Please advise if this can be sent and if so please send prep instructions.     Scheduled procedure/appointment date if applicable: Apt 8/16

## 2024-08-16 ENCOUNTER — ANESTHESIA EVENT (OUTPATIENT)
Dept: GASTROENTEROLOGY | Facility: HOSPITAL | Age: 64
End: 2024-08-16

## 2024-08-16 ENCOUNTER — HOSPITAL ENCOUNTER (OUTPATIENT)
Dept: GASTROENTEROLOGY | Facility: HOSPITAL | Age: 64
Setting detail: OUTPATIENT SURGERY
Discharge: HOME | End: 2024-08-16
Attending: COLON & RECTAL SURGERY
Payer: COMMERCIAL

## 2024-08-16 ENCOUNTER — ANESTHESIA (OUTPATIENT)
Dept: GASTROENTEROLOGY | Facility: HOSPITAL | Age: 64
End: 2024-08-16

## 2024-08-16 VITALS
WEIGHT: 132 LBS | BODY MASS INDEX: 21.21 KG/M2 | OXYGEN SATURATION: 100 % | HEIGHT: 66 IN | DIASTOLIC BLOOD PRESSURE: 62 MMHG | TEMPERATURE: 97 F | RESPIRATION RATE: 18 BRPM | SYSTOLIC BLOOD PRESSURE: 149 MMHG | HEART RATE: 68 BPM

## 2024-08-16 DIAGNOSIS — Z86.010 HISTORY OF COLON POLYPS: ICD-10-CM

## 2024-08-16 PROBLEM — R11.2 PONV (POSTOPERATIVE NAUSEA AND VOMITING): Status: ACTIVE | Noted: 2024-08-16

## 2024-08-16 PROBLEM — Z98.890 PONV (POSTOPERATIVE NAUSEA AND VOMITING): Status: ACTIVE | Noted: 2024-08-16

## 2024-08-16 PROCEDURE — G0105 COLORECTAL SCRN; HI RISK IND: HCPCS | Performed by: COLON & RECTAL SURGERY

## 2024-08-16 RX ORDER — PROPOFOL 10 MG/ML
INJECTION, EMULSION INTRAVENOUS AS NEEDED
Status: DISCONTINUED | OUTPATIENT
Start: 2024-08-16 | End: 2024-08-16

## 2024-08-16 RX ORDER — SODIUM CHLORIDE, SODIUM LACTATE, POTASSIUM CHLORIDE, CALCIUM CHLORIDE 600; 310; 30; 20 MG/100ML; MG/100ML; MG/100ML; MG/100ML
INJECTION, SOLUTION INTRAVENOUS CONTINUOUS PRN
Status: DISCONTINUED | OUTPATIENT
Start: 2024-08-16 | End: 2024-08-16

## 2024-08-16 RX ADMIN — SODIUM CHLORIDE, SODIUM LACTATE, POTASSIUM CHLORIDE, AND CALCIUM CHLORIDE: .6; .31; .03; .02 INJECTION, SOLUTION INTRAVENOUS at 07:55

## 2024-08-16 RX ADMIN — PROPOFOL 50 MG: 10 INJECTION, EMULSION INTRAVENOUS at 08:08

## 2024-08-16 RX ADMIN — PROPOFOL 30 MG: 10 INJECTION, EMULSION INTRAVENOUS at 08:20

## 2024-08-16 RX ADMIN — PROPOFOL 150 MG: 10 INJECTION, EMULSION INTRAVENOUS at 08:05

## 2024-08-16 RX ADMIN — PROPOFOL 20 MG: 10 INJECTION, EMULSION INTRAVENOUS at 08:12

## 2024-08-16 RX ADMIN — PROPOFOL 50 MG: 10 INJECTION, EMULSION INTRAVENOUS at 08:13

## 2024-08-16 RX ADMIN — PROPOFOL 30 MG: 10 INJECTION, EMULSION INTRAVENOUS at 08:18

## 2024-08-16 NOTE — ANESTHESIA PREPROCEDURE EVALUATION
Procedure:  COLONOSCOPY    Relevant Problems   ANESTHESIA   (+) PONV (postoperative nausea and vomiting)      CARDIO   (+) Hyperlipidemia      GI/HEPATIC   (+) Rectal bleeding      MUSCULOSKELETAL   (+) Cervical spondylosis   (+) Primary osteoarthritis of right knee      Orthopedic/Musculoskeletal   (+) Cervical stenosis of spinal canal        Physical Exam    Airway    Mallampati score: III  TM Distance: >3 FB  Neck ROM: full     Dental        Cardiovascular      Pulmonary      Other Findings  post-pubertal.      Anesthesia Plan  ASA Score- 2     Anesthesia Type- IV sedation with anesthesia with ASA Monitors.         Additional Monitors:     Airway Plan:            Plan Factors-Exercise tolerance (METS): >4 METS.    Chart reviewed.   Existing labs reviewed. Patient summary reviewed.    Patient is not a current smoker.              Induction- intravenous.    Postoperative Plan-         Informed Consent- Anesthetic plan and risks discussed with patient.  I personally reviewed this patient with the CRNA. Discussed and agreed on the Anesthesia Plan with the CRNA..

## 2024-08-16 NOTE — H&P
History and Physical   Colon and Rectal Surgery   Roseline Duong 64 y.o. female MRN: [id number]  Unit/Bed#:  Encounter: [id number]  24   7:55 AM      CC:  History of adenomas of the colon.    History of Present Illness   HPI:  Roseline Duong is a 64 y.o. female with no GI symptoms.  Historical Information   Past Medical History:   Diagnosis Date    Cervical spinal stenosis     Chronic pain disorder     knee, right    Ear problems     Glaucoma     Herpes     High cholesterol      Past Surgical History:   Procedure Laterality Date    CATARACT EXTRACTION Bilateral     COLONOSCOPY      FOOT SURGERY      KNEE ARTHROSCOPY W/ MENISCAL REPAIR      [facility] XCAPSL CTRC RMVL INSJ IO LENS PROSTH W/O ECP Right 2019    Procedure: PHACO W/IOL (TORIC LENS);  Surgeon: Pratibha Toledo MD;  Location:  MAIN OR;  Service: Ophthalmology    [place] XCAPSL CTRC RMVL INSJ IO LENS PROSTH W/O ECP Left 2019    Procedure: PHACO W/IOL (TORIC LENS);  Surgeon: Pratbiha Toledo MD;  Location:  MAIN OR;  Service: Ophthalmology    TUBAL LIGATION         Meds/Allergies     Not in a hospital admission.      Current Outpatient Medications:     Restasis 0.05 % ophthalmic emulsion, INSTILL 1 DROP INTO LEFT EYE EVERY 12 HOURS, Disp: , Rfl:     APPLE CIDER VINEGAR PO, Take by mouth (Patient not taking: Reported on 2024), Disp: , Rfl:     Flaxseed, Linseed, (FLAXSEED OIL) 1000 MG CAPS, Take by mouth, Disp: , Rfl:     multivitamin (THERAGRAN) TABS, Take 1 tablet by mouth daily, Disp: , Rfl:     No Known Allergies      Social History   Social History     Substance and Sexual Activity   Alcohol Use Yes    Comment: Social     Social History     Substance and Sexual Activity   Drug Use No     Social History     Tobacco Use   Smoking Status Former    Current packs/day: 0.00    Average packs/day: 0.3 packs/day for 5.0 years (1.3 ttl pk-yrs)    Types: Cigarettes    Quit date:     Years since quittin.6   Smokeless Tobacco Never         Family  "History:   Family History   Problem Relation Age of Onset    Breast cancer Mother 53    Uterine cancer Mother 62    Throat cancer Father 62    No Known Problems Sister     No Known Problems Daughter     Lupus Maternal Grandmother     Stroke Maternal Grandfather     No Known Problems Paternal Grandmother     No Known Problems Paternal Grandfather     No Known Problems Son     Breast cancer Paternal Aunt 50    Lung cancer Paternal Aunt 70    Stomach cancer Paternal Aunt     Lung cancer Paternal Uncle 70    BRCA 1/2 Cousin         pt unsure of result-all cousin's sisters were tested    Breast cancer Cousin 26    Colon cancer Neg Hx          Objective     Current Vitals:   Blood Pressure: 139/77 (08/16/24 0729)  Pulse: 85 (08/16/24 0729)  Temperature: 97.8 °F (36.6 °C) (08/16/24 0729)  Temp Source: Temporal (08/16/24 0729)  Respirations: 16 (08/16/24 0729)  Height: 5' 6\" (167.6 cm) (08/16/24 0729)  Weight - Scale: 59.9 kg (132 lb) (08/16/24 0729)  SpO2: 99 % (08/16/24 0729)  No intake or output data in the 24 hours ending 08/16/24 0755    Physical Exam:  General:  Well nourished, no distress.  Neuro: Alert and oriented  Eyes:Sclera anicteric, conjunctiva pink.  Pulm: Clear to auscultation bilaterally. No respiratory Distress.   CV:  Regular rate and rhythm. No murmurs.  Abdomen:  Soft, flat, non-tender, without masses or hepatosplenomegaly.    Lab Results:       ASSESSMENT:  Roseline Duong is a 64 y.o. female for surveillance.  PLAN:  Colonoscopy.  Risks , including, but not limited to, bleeding, perforation, missed lesions, and potential need for surgery, were reviewed. Alternatives to colonoscopy were discussed.  DEVANG Palomares MD  "

## 2024-08-16 NOTE — ANESTHESIA POSTPROCEDURE EVALUATION
Post-Op Assessment Note    CV Status:  Stable    Pain management: adequate       Mental Status:  Alert and awake   Hydration Status:  Euvolemic   PONV Controlled:  Controlled   Airway Patency:  Patent     Post Op Vitals Reviewed: Yes    No anethesia notable event occurred.    Staff: Anesthesiologist, CRNA               /66 (08/16/24 0829)    Temp 97.8 °F (36.6 °C) (08/16/24 0829)    Pulse 73 (08/16/24 0829)   Resp 18 (08/16/24 0829)    SpO2 100 % (08/16/24 0829)

## 2024-09-09 ENCOUNTER — HOSPITAL ENCOUNTER (OUTPATIENT)
Dept: RADIOLOGY | Facility: HOSPITAL | Age: 64
Discharge: HOME | End: 2024-09-09
Payer: COMMERCIAL

## 2024-09-09 VITALS — BODY MASS INDEX: 21.21 KG/M2 | WEIGHT: 132 LBS | HEIGHT: 66 IN

## 2024-09-09 DIAGNOSIS — Z12.31 VISIT FOR SCREENING MAMMOGRAM: ICD-10-CM

## 2024-09-09 PROCEDURE — 77067 SCR MAMMO BI INCL CAD: CPT

## 2024-09-09 PROCEDURE — 77063 BREAST TOMOSYNTHESIS BI: CPT

## 2025-04-10 ENCOUNTER — ANNUAL EXAM (OUTPATIENT)
Dept: OBGYN CLINIC | Facility: CLINIC | Age: 65
End: 2025-04-10
Payer: COMMERCIAL

## 2025-04-10 VITALS
SYSTOLIC BLOOD PRESSURE: 120 MMHG | BODY MASS INDEX: 22.5 KG/M2 | HEIGHT: 66 IN | WEIGHT: 140 LBS | DIASTOLIC BLOOD PRESSURE: 76 MMHG

## 2025-04-10 DIAGNOSIS — Z12.31 ENCOUNTER FOR SCREENING MAMMOGRAM FOR MALIGNANT NEOPLASM OF BREAST: ICD-10-CM

## 2025-04-10 DIAGNOSIS — Z12.11 SCREENING FOR COLON CANCER: ICD-10-CM

## 2025-04-10 DIAGNOSIS — Z01.419 ENCOUNTER FOR GYNECOLOGICAL EXAMINATION WITHOUT ABNORMAL FINDING: Primary | ICD-10-CM

## 2025-04-10 PROCEDURE — S0612 ANNUAL GYNECOLOGICAL EXAMINA: HCPCS | Performed by: OBSTETRICS & GYNECOLOGY

## 2025-04-10 RX ORDER — ROSUVASTATIN CALCIUM 5 MG/1
5 TABLET, COATED ORAL
COMMUNITY
Start: 2025-03-05

## 2025-04-10 NOTE — PROGRESS NOTES
Roseline Duong   1960    CC:  Yearly exam    S:  64 y.o. female here for yearly exam. She is postmenopausal and has had no vaginal bleeding.  She denies vaginal discharge, itching, odor or dryness.     Sexual activity: She is not sexually active.     Last Pap: 2/16/2021 - normal/negative HPV  Last Mammo: 9/9/2024 - BIRAD-1  Last Colonoscopy: 6 mos ago per patient report - normal  Last DEXA: never; no family hx osteoporosis    We reviewed [facility] guidelines for Pap testing.       Current Outpatient Medications:     Coenzyme Q10 (CO Q 10 PO), Take by mouth, Disp: , Rfl:     Flaxseed, Linseed, (FLAXSEED OIL) 1000 MG CAPS, Take by mouth, Disp: , Rfl:     Restasis 0.05 % ophthalmic emulsion, INSTILL 1 DROP INTO LEFT EYE EVERY 12 HOURS, Disp: , Rfl:     rosuvastatin (CRESTOR) 5 mg tablet, Take 5 mg by mouth, Disp: , Rfl:     APPLE CIDER VINEGAR PO, Take by mouth (Patient not taking: Reported on 4/2/2024), Disp: , Rfl:     multivitamin (THERAGRAN) TABS, Take 1 tablet by mouth daily (Patient not taking: Reported on 4/10/2025), Disp: , Rfl:   Social History     Socioeconomic History    Marital status: /Civil Union     Spouse name: Not on file    Number of children: Not on file    Years of education: Not on file    Highest education level: Not on file   Occupational History    Not on file   Tobacco Use    Smoking status: Former     Current packs/day: 0.00     Average packs/day: 0.3 packs/day for 5.2 years (1.3 ttl pk-yrs)     Types: Cigarettes     Quit date: 2010     Years since quitting: 15.2    Smokeless tobacco: Never   Vaping Use    Vaping status: Never Used   Substance and Sexual Activity    Alcohol use: Yes     Comment: Social    Drug use: No    Sexual activity: Not Currently     Birth control/protection: Post-menopausal   Other Topics Concern    Not on file   Social History Narrative    Not on file     Social Drivers of Health     Financial Resource Strain: Medium Risk (9/6/2024)    [facility]    Overall Financial Resource Strain (CARDIA)     Difficulty of Paying Living Expenses: Somewhat hard   Food Insecurity: No Food Insecurity (9/6/2024)    Received from [facility]    Hunger Vital Sign     Worried About Running Out of Food in the Last Year: Never true     Ran Out of Food in the Last Year: Never true   Transportation Needs: No Transportation Needs (9/6/2024)    Received from [facility]    PRAPARE - Transportation     Lack of Transportation (Medical): No     Lack of Transportation (Non-Medical): No   Physical Activity: Not on file   Stress: No Stress Concern Present (9/6/2024)    Received from [facility]    [place] [employer] - Occupational Stress Questionnaire     Feeling of Stress : Not at all   Social Connections: Feeling Socially Integrated (9/6/2024)    Received from [facility]    OASIS : Social Isolation     How often do you feel lonely or isolated from those around you?: Never   Intimate Partner Violence: Not At Risk (9/6/2024)    [facility]    Humiliation, Afraid, Rape, and Kick questionnaire     Fear of Current or Ex-Partner: No     Emotionally Abused: No     Physically Abused: No     Sexually Abused: No   Housing Stability: High Risk (9/6/2024)    Received from [facility]    Housing Stability Vital Sign     Unable to Pay for Housing in the Last Year: Yes     Number of Times Moved in the Last Year: 0     Homeless in the Last Year: No     Family History   Problem Relation Age of Onset    Breast cancer Mother 53    Uterine cancer Mother 62    Endometrial cancer Mother     Throat cancer Father 62    No Known Problems Sister     No Known Problems Daughter     Lupus Maternal Grandmother     Stroke Maternal Grandfather     No Known Problems Paternal Grandmother     No Known Problems Paternal Grandfather  "    No Known Problems Son     Breast cancer Paternal Aunt 50    Lung cancer Paternal Aunt 70    Stomach cancer Paternal Aunt     Lung cancer Paternal Uncle 70    BRCA 1/2 Cousin         pt unsure of result-all cousin's sisters were tested    Breast cancer Cousin 26    Colon cancer Neg Hx      Past Medical History:   Diagnosis Date    Cervical spinal stenosis     Chronic pain disorder     knee, right    Ear problems     Glaucoma     Herpes     High cholesterol         Review of Systems   Respiratory: Negative.    Cardiovascular: Negative.    Gastrointestinal: Negative for constipation and diarrhea.   Genitourinary: Negative for difficulty urinating, pelvic pain, vaginal bleeding, vaginal discharge, itching or odor.    O:  Blood pressure 120/76, height 5' 6\" (1.676 m), weight 63.5 kg (140 lb).    Patient appears well and is not in distress  Neck is supple without masses  Breasts are symmetrical without mass, tenderness, nipple discharge, skin changes or adenopathy.   Abdomen is soft and nontender without masses.   External genitals are normal without lesions or rashes.  Urethral meatus and urethra are normal  Bladder is normal to palpation  Vagina is normal without discharge or bleeding.   Cervix is normal without discharge or lesion.   Uterus is normal, mobile, nontender without palpable mass.  Adnexa are normal, nontender, without palpable mass.     A:   Yearly exam.     P:   Pap due 2026  Mammo ordered   Colonoscopy up to date   DEXA due age 67 unless new risk factors develop    RTO one year for yearly exam or sooner as needed.     "

## 2025-04-29 ENCOUNTER — TELEPHONE (OUTPATIENT)
Age: 65
End: 2025-04-29

## 2025-04-29 NOTE — TELEPHONE ENCOUNTER
Patient asking for screening mammogram order. Advised order is already in chart. Phone number given to central scheduling.

## (undated) DEVICE — PACK CUSTOM EYE BASIC

## (undated) DEVICE — 3M™ TEGADERM™ TRANSPARENT FILM DRESSING FRAME STYLE, 1624W, 2-3/8 IN X 2-3/4 IN (6 CM X 7 CM), 100/CT 4CT/CASE: Brand: 3M™ TEGADERM™

## (undated) DEVICE — THE MONARCH® "D" CARTRIDGE IS A SINGLE-USE POLYPROPYLENE CARTRIDGE FOR POSTERIOR CHAMBER IOL DELIVERY: Brand: MONARCH® III

## (undated) DEVICE — SKIN MARKER DUAL TIP WITH RULER CAP, FLEXIBLE RULER AND LABELS: Brand: DEVON

## (undated) DEVICE — SYRINGE 3ML LL

## (undated) DEVICE — RING CORNEA FIXATION CLEAR

## (undated) DEVICE — OCUCOAT

## (undated) DEVICE — STERILE POLYISOPRENE POWDER-FREE SURGICAL GLOVES: Brand: PROTEXIS

## (undated) DEVICE — NEEDLE BLUNT 18 G X 1 1/2IN

## (undated) DEVICE — NEEDLE FILTER 5 MICR 19G X 1.5IN

## (undated) DEVICE — MICROSURGICAL INSTRUMENT ANTERIOR CHAMBER CANNULA 27GA: Brand: ALCON

## (undated) DEVICE — 30° ROUND, 0.9 MM TURBOSONICS® TAPERED ABS® MICROTIP™ TIP: Brand: ALCON, TURBOSONICS, TAPERED ABS, MICROTIP

## (undated) DEVICE — PACK PIC GENERIC

## (undated) DEVICE — CYSTOTOME IRRIGATION 25G

## (undated) DEVICE — BLADE CORNEA CLEAR 2.8

## (undated) DEVICE — NEEDLE 25G X 5/8 SAFETY

## (undated) DEVICE — LAMINECTOMY ARM CRADLE FOAM POSITIONER: Brand: CARDINAL HEALTH

## (undated) DEVICE — OPHTHALMIC KNIFE 15°: Brand: ALCON

## (undated) DEVICE — EYE PADS 1 5/8"X2 5/8": Brand: MCKESSON